# Patient Record
Sex: MALE | Race: WHITE | NOT HISPANIC OR LATINO | Employment: OTHER | URBAN - METROPOLITAN AREA
[De-identification: names, ages, dates, MRNs, and addresses within clinical notes are randomized per-mention and may not be internally consistent; named-entity substitution may affect disease eponyms.]

---

## 2023-01-27 ENCOUNTER — PRE-ADMISSION TESTING (OUTPATIENT)
Dept: PREADMISSION TESTING | Facility: HOSPITAL | Age: 78
End: 2023-01-27
Payer: MEDICARE

## 2023-01-27 ENCOUNTER — HOSPITAL ENCOUNTER (OUTPATIENT)
Dept: GENERAL RADIOLOGY | Facility: HOSPITAL | Age: 78
Discharge: HOME | End: 2023-01-27
Payer: MEDICARE

## 2023-01-27 VITALS
OXYGEN SATURATION: 96 % | HEIGHT: 72 IN | SYSTOLIC BLOOD PRESSURE: 171 MMHG | BODY MASS INDEX: 33.36 KG/M2 | WEIGHT: 246.3 LBS | TEMPERATURE: 97.1 F | RESPIRATION RATE: 16 BRPM | DIASTOLIC BLOOD PRESSURE: 94 MMHG | HEART RATE: 90 BPM

## 2023-01-27 LAB
ALBUMIN SERPL-MCNC: 4 G/DL (ref 3.5–5.2)
ALBUMIN/GLOB SERPL: 1.6 G/DL
ALP SERPL-CCNC: 63 U/L (ref 39–117)
ALT SERPL W P-5'-P-CCNC: 28 U/L (ref 1–41)
ANION GAP SERPL CALCULATED.3IONS-SCNC: 10.3 MMOL/L (ref 5–15)
AST SERPL-CCNC: 17 U/L (ref 1–40)
BILIRUB SERPL-MCNC: 0.4 MG/DL (ref 0–1.2)
BUN SERPL-MCNC: 14 MG/DL (ref 8–23)
BUN/CREAT SERPL: 14.6 (ref 7–25)
CALCIUM SPEC-SCNC: 8.9 MG/DL (ref 8.6–10.5)
CHLORIDE SERPL-SCNC: 101 MMOL/L (ref 98–107)
CO2 SERPL-SCNC: 24.7 MMOL/L (ref 22–29)
CREAT SERPL-MCNC: 0.96 MG/DL (ref 0.76–1.27)
DEPRECATED RDW RBC AUTO: 43.3 FL (ref 37–54)
EGFRCR SERPLBLD CKD-EPI 2021: 81.9 ML/MIN/1.73
ERYTHROCYTE [DISTWIDTH] IN BLOOD BY AUTOMATED COUNT: 13 % (ref 12.3–15.4)
GLOBULIN UR ELPH-MCNC: 2.5 GM/DL
GLUCOSE SERPL-MCNC: 196 MG/DL (ref 65–99)
HBA1C MFR BLD: 8 % (ref 4.8–5.6)
HCT VFR BLD AUTO: 43.7 % (ref 37.5–51)
HGB BLD-MCNC: 15.1 G/DL (ref 13–17.7)
INR PPP: 1.06 (ref 0.9–1.1)
MCH RBC QN AUTO: 31.4 PG (ref 26.6–33)
MCHC RBC AUTO-ENTMCNC: 34.6 G/DL (ref 31.5–35.7)
MCV RBC AUTO: 90.9 FL (ref 79–97)
PLATELET # BLD AUTO: 253 10*3/MM3 (ref 140–450)
PMV BLD AUTO: 8.7 FL (ref 6–12)
POTASSIUM SERPL-SCNC: 4.3 MMOL/L (ref 3.5–5.2)
PROT SERPL-MCNC: 6.5 G/DL (ref 6–8.5)
PROTHROMBIN TIME: 13.9 SECONDS (ref 11.7–14.2)
RBC # BLD AUTO: 4.81 10*6/MM3 (ref 4.14–5.8)
SODIUM SERPL-SCNC: 136 MMOL/L (ref 136–145)
WBC NRBC COR # BLD: 6.04 10*3/MM3 (ref 3.4–10.8)

## 2023-01-27 PROCEDURE — 36415 COLL VENOUS BLD VENIPUNCTURE: CPT

## 2023-01-27 PROCEDURE — 83036 HEMOGLOBIN GLYCOSYLATED A1C: CPT

## 2023-01-27 PROCEDURE — 80053 COMPREHEN METABOLIC PANEL: CPT

## 2023-01-27 PROCEDURE — 85027 COMPLETE CBC AUTOMATED: CPT

## 2023-01-27 PROCEDURE — 73560 X-RAY EXAM OF KNEE 1 OR 2: CPT

## 2023-01-27 PROCEDURE — 85610 PROTHROMBIN TIME: CPT

## 2023-01-27 PROCEDURE — 71046 X-RAY EXAM CHEST 2 VIEWS: CPT

## 2023-01-27 RX ORDER — AMLODIPINE AND OLMESARTAN MEDOXOMIL 5; 40 MG/1; MG/1
1 TABLET ORAL DAILY
COMMUNITY
Start: 2023-01-19 | End: 2023-01-27

## 2023-01-27 RX ORDER — TAMSULOSIN HYDROCHLORIDE 0.4 MG/1
2 CAPSULE ORAL NIGHTLY
COMMUNITY
Start: 2022-12-30

## 2023-01-27 RX ORDER — DUTASTERIDE 0.5 MG/1
1 CAPSULE, LIQUID FILLED ORAL NIGHTLY
COMMUNITY
Start: 2023-01-06

## 2023-01-27 RX ORDER — FENOFIBRATE 160 MG/1
1 TABLET ORAL DAILY
COMMUNITY
Start: 2023-01-03

## 2023-01-27 RX ORDER — POTASSIUM CITRATE 10 MEQ/1
2 TABLET, EXTENDED RELEASE ORAL 3 TIMES DAILY
COMMUNITY
Start: 2023-01-04

## 2023-01-27 RX ORDER — BUDESONIDE AND FORMOTEROL FUMARATE DIHYDRATE 160; 4.5 UG/1; UG/1
2 AEROSOL RESPIRATORY (INHALATION) 2 TIMES DAILY
COMMUNITY
Start: 2023-01-04

## 2023-01-27 RX ORDER — EZETIMIBE 10 MG/1
10 TABLET ORAL DAILY
COMMUNITY
Start: 2023-01-03

## 2023-01-27 RX ORDER — SITAGLIPTIN 100 MG/1
1 TABLET, FILM COATED ORAL DAILY
COMMUNITY
Start: 2023-01-13

## 2023-01-27 RX ORDER — ASPIRIN 81 MG/1
81 TABLET, CHEWABLE ORAL DAILY
COMMUNITY
End: 2023-02-10 | Stop reason: HOSPADM

## 2023-01-27 RX ORDER — CHLORHEXIDINE GLUCONATE 500 MG/1
1 CLOTH TOPICAL TAKE AS DIRECTED
COMMUNITY
End: 2023-02-10 | Stop reason: HOSPADM

## 2023-01-27 RX ORDER — SIMVASTATIN 20 MG
20 TABLET ORAL DAILY
COMMUNITY
Start: 2023-01-03

## 2023-01-27 RX ORDER — CELECOXIB 200 MG/1
1 CAPSULE ORAL DAILY
COMMUNITY
Start: 2023-01-04

## 2023-01-27 NOTE — DISCHARGE INSTRUCTIONS
Take the following medications the morning of surgery: TAKE AND BRING INHALERS    TIME OF ARRIVAL: WILL BE CALLED DAY BEFORE SURGERY    If you are on prescription narcotic pain medication to control your pain you may also take that medication the morning of surgery.    General Instructions:  Do not eat solid food after midnight the night before surgery.  You may drink clear liquids day of surgery but must stop at least one hour before your hospital arrival time.  It is beneficial for you to have a clear drink that contains carbohydrates the day of surgery.  We suggest a 12 to 20 ounce bottle of Gatorade or Powerade for non-diabetic patients or a 12 to 20 ounce bottle of G2 or Powerade Zero for diabetic patients. (Pediatric patients, are not advised to drink a 12 to 20 ounce carbohydrate drink)    Clear liquids are liquids you can see through.  Nothing red in color.     Plain water                               Sports drinks  Sodas                                   Gelatin (Jell-O)  Fruit juices without pulp such as white grape juice and apple juice  Popsicles that contain no fruit or yogurt  Tea or coffee (no cream or milk added)  Gatorade / Powerade  G2 / Powerade Zero    Patients who avoid smoking, chewing tobacco and alcohol for 4 weeks prior to surgery have a reduced risk of post-operative complications.  Quit smoking as many days before surgery as you can.  Do not smoke, use chewing tobacco or drink alcohol the day of surgery.   If applicable bring your C-PAP/ BI-PAP machine.  Bring any papers given to you in the doctor’s office.  Wear clean comfortable clothes.  Do not wear contact lenses, false eyelashes or make-up.  Bring a case for your glasses.   Bring crutches or walker if applicable.  Remove all piercings.  Leave jewelry and any other valuables at home.  Hair extensions with metal clips must be removed prior to surgery.  The Pre-Admission Testing nurse will instruct you to bring medications if unable to  obtain an accurate list in Pre-Admission Testing.        Preventing a Surgical Site Infection:  For 2 to 3 days before surgery, avoid shaving with a razor because the razor can irritate skin and make it easier to develop an infection.    Any areas of open skin can increase the risk of a post-operative wound infection by allowing bacteria to enter and travel throughout the body.  Notify your surgeon if you have any skin wounds / rashes even if it is not near the expected surgical site.  The area will need assessed to determine if surgery should be delayed until it is healed.  The night prior to surgery shower using a fresh bar of anti-bacterial soap (such as Dial) and clean washcloth.  Sleep in a clean bed with clean clothing.  Do not allow pets to sleep with you.  Shower on the morning of surgery using a fresh bar of anti-bacterial soap (such as Dial) and clean washcloth.  Dry with a clean towel and dress in clean clothing.  Ask your surgeon if you will be receiving antibiotics prior to surgery.  Make sure you, your family, and all healthcare providers clean their hands with soap and water or an alcohol based hand  before caring for you or your wound.    Day of surgery:  Your arrival time is approximately two hours before your scheduled surgery time.  Upon arrival, a Pre-op nurse and Anesthesiologist will review your health history, obtain vital signs, and answer questions you may have.  The only belongings needed at this time will be a list of your home medications and if applicable your C-PAP/BI-PAP machine.  A Pre-op nurse will start an IV and you may receive medication in preparation for surgery, including something to help you relax.     Please be aware that surgery does come with discomfort.  We want to make every effort to control your discomfort so please discuss any uncontrolled symptoms with your nurse.   Your doctor will most likely have prescribed pain medications.      If you are going home  after surgery you will receive individualized written care instructions before being discharged.  A responsible adult must drive you to and from the hospital on the day of your surgery and stay with you for 24 hours.  Discharge prescriptions can be filled by the hospital pharmacy during regular pharmacy hours.  If you are having surgery late in the day/evening your prescription may be e-prescribed to your pharmacy.  Please verify your pharmacy hours or chose a 24 hour pharmacy to avoid not having access to your prescription because your pharmacy has closed for the day.    If you are staying overnight following surgery, you will be transported to your hospital room following the recovery period.  [facility] has all private rooms.    If you have any questions please call Pre-Admission Testing at [phone].  Deductibles and co-payments are collected on the day of service. Please be prepared to pay the required co-pay, deductible or deposit on the day of service as defined by your plan.    Call your surgeon immediately if you experience any of the following symptoms:  Sore Throat  Shortness of Breath or difficulty breathing  Cough  Chills  Body soreness or muscle pain  Headache  Fever  New loss of taste or smell  Do not arrive for your surgery ill.  Your procedure will need to be rescheduled to another time.  You will need to call your physician before the day of surgery to avoid any unnecessary exposure to hospital staff as well as other patients.    CHLORHEXIDINE CLOTH INSTRUCTIONS  The morning of surgery follow these instructions using the Chlorhexidine cloths you've been given.  These steps reduce bacteria on the body.  Do not use the cloths near your eyes, ears mouth, genitalia or on open wounds.  Throw the cloths away after use but do not try to flush them down a toilet.      Open and remove one cloth at a time from the package.    Leave the cloth unfolded and begin the bathing.  Massage the  skin with the cloths using gentle pressure to remove bacteria.  Do not scrub harshly.   Follow the steps below with one 2% CHG cloth per area (6 total cloths).  One cloth for neck, shoulders and chest.  One cloth for both arms, hands, fingers and underarms (do underarms last).  One cloth for the abdomen followed by groin.  One cloth for right leg and foot including between the toes.  One cloth for left leg and foot including between the toes.  The last cloth is to be used for the back of the neck, back and buttocks.    Allow the CHG to air dry 3 minutes on the skin which will give it time to work and decrease the chance of irritation.  The skin may feel sticky until it is dry.  Do not rinse with water or any other liquid or you will lose the beneficial effects of the CHG.  If mild skin irritation occurs, do rinse the skin to remove the CHG.  Report this to the nurse at time of admission.  Do not apply lotions, creams, ointments, deodorants or perfumes after using the clothes. Dress in clean clothes before coming to the hospital.

## 2023-02-09 ENCOUNTER — APPOINTMENT (OUTPATIENT)
Dept: GENERAL RADIOLOGY | Facility: HOSPITAL | Age: 78
End: 2023-02-09
Payer: MEDICARE

## 2023-02-09 ENCOUNTER — ANESTHESIA (OUTPATIENT)
Dept: PERIOP | Facility: HOSPITAL | Age: 78
End: 2023-02-09
Payer: MEDICARE

## 2023-02-09 ENCOUNTER — HOSPITAL ENCOUNTER (OUTPATIENT)
Facility: HOSPITAL | Age: 78
Discharge: HOME | End: 2023-02-10
Attending: ORTHOPAEDIC SURGERY | Admitting: ORTHOPAEDIC SURGERY
Payer: MEDICARE

## 2023-02-09 ENCOUNTER — ANESTHESIA EVENT (OUTPATIENT)
Dept: PERIOP | Facility: HOSPITAL | Age: 78
End: 2023-02-09
Payer: MEDICARE

## 2023-02-09 DIAGNOSIS — M17.12 PRIMARY OSTEOARTHRITIS OF LEFT KNEE: Primary | ICD-10-CM

## 2023-02-09 LAB — GLUCOSE BLDC GLUCOMTR-MCNC: 148 MG/DL (ref 70–130)

## 2023-02-09 PROCEDURE — 73560 X-RAY EXAM OF KNEE 1 OR 2: CPT

## 2023-02-09 PROCEDURE — 63710000001 TAMSULOSIN 0.4 MG CAPSULE: Performed by: ORTHOPAEDIC SURGERY

## 2023-02-09 PROCEDURE — 25010000002 FENTANYL CITRATE (PF) 50 MCG/ML SOLUTION: Performed by: ANESTHESIOLOGY

## 2023-02-09 PROCEDURE — 63710000001 HYDROCODONE-ACETAMINOPHEN 7.5-325 MG TABLET: Performed by: NURSE ANESTHETIST, CERTIFIED REGISTERED

## 2023-02-09 PROCEDURE — A9270 NON-COVERED ITEM OR SERVICE: HCPCS | Performed by: ORTHOPAEDIC SURGERY

## 2023-02-09 PROCEDURE — 25010000002 ROPIVACAINE PER 1 MG: Performed by: ORTHOPAEDIC SURGERY

## 2023-02-09 PROCEDURE — 94640 AIRWAY INHALATION TREATMENT: CPT

## 2023-02-09 PROCEDURE — 63710000001 FINASTERIDE 5 MG TABLET: Performed by: ORTHOPAEDIC SURGERY

## 2023-02-09 PROCEDURE — 25010000002 CEFAZOLIN IN DEXTROSE 2-4 GM/100ML-% SOLUTION: Performed by: ORTHOPAEDIC SURGERY

## 2023-02-09 PROCEDURE — 63710000001 BUDESONIDE-FORMOTEROL 160-4.5 MCG/ACT AEROSOL 6 G INHALER: Performed by: ORTHOPAEDIC SURGERY

## 2023-02-09 PROCEDURE — G0378 HOSPITAL OBSERVATION PER HR: HCPCS

## 2023-02-09 PROCEDURE — 25010000002 MIDAZOLAM PER 1 MG: Performed by: ANESTHESIOLOGY

## 2023-02-09 PROCEDURE — 25010000002 DEXAMETHASONE SODIUM PHOSPHATE 20 MG/5ML SOLUTION: Performed by: NURSE ANESTHETIST, CERTIFIED REGISTERED

## 2023-02-09 PROCEDURE — 63710000001 CELECOXIB 200 MG CAPSULE: Performed by: ORTHOPAEDIC SURGERY

## 2023-02-09 PROCEDURE — 25010000002 ONDANSETRON PER 1 MG: Performed by: NURSE ANESTHETIST, CERTIFIED REGISTERED

## 2023-02-09 PROCEDURE — C1713 ANCHOR/SCREW BN/BN,TIS/BN: HCPCS | Performed by: ORTHOPAEDIC SURGERY

## 2023-02-09 PROCEDURE — 97161 PT EVAL LOW COMPLEX 20 MIN: CPT

## 2023-02-09 PROCEDURE — 97110 THERAPEUTIC EXERCISES: CPT

## 2023-02-09 PROCEDURE — 94799 UNLISTED PULMONARY SVC/PX: CPT

## 2023-02-09 PROCEDURE — 25010000002 FENTANYL CITRATE (PF) 100 MCG/2ML SOLUTION: Performed by: NURSE ANESTHETIST, CERTIFIED REGISTERED

## 2023-02-09 PROCEDURE — 63710000001 POVIDONE-IODINE 10 % SOLUTION 30 ML BOTTLE: Performed by: ORTHOPAEDIC SURGERY

## 2023-02-09 PROCEDURE — 63710000001 ACETAMINOPHEN 500 MG TABLET: Performed by: ORTHOPAEDIC SURGERY

## 2023-02-09 PROCEDURE — 25010000002 MORPHINE PER 10 MG: Performed by: ORTHOPAEDIC SURGERY

## 2023-02-09 PROCEDURE — 25010000002 KETOROLAC TROMETHAMINE PER 15 MG: Performed by: ORTHOPAEDIC SURGERY

## 2023-02-09 PROCEDURE — 25010000002 ROPIVACAINE PER 1 MG: Performed by: ANESTHESIOLOGY

## 2023-02-09 PROCEDURE — C1776 JOINT DEVICE (IMPLANTABLE): HCPCS | Performed by: ORTHOPAEDIC SURGERY

## 2023-02-09 PROCEDURE — 94760 N-INVAS EAR/PLS OXIMETRY 1: CPT

## 2023-02-09 PROCEDURE — 25010000002 CEFAZOLIN PER 500 MG: Performed by: NURSE ANESTHETIST, CERTIFIED REGISTERED

## 2023-02-09 PROCEDURE — 25010000002 EPINEPHRINE 1 MG/ML SOLUTION 30 ML VIAL: Performed by: ORTHOPAEDIC SURGERY

## 2023-02-09 PROCEDURE — 63710000001 OXYCODONE-ACETAMINOPHEN 5-325 MG TABLET: Performed by: ORTHOPAEDIC SURGERY

## 2023-02-09 PROCEDURE — 82962 GLUCOSE BLOOD TEST: CPT

## 2023-02-09 PROCEDURE — 25010000002 PROPOFOL 10 MG/ML EMULSION: Performed by: NURSE ANESTHETIST, CERTIFIED REGISTERED

## 2023-02-09 PROCEDURE — 63710000001 MUPIROCIN 2 % OINTMENT: Performed by: ORTHOPAEDIC SURGERY

## 2023-02-09 PROCEDURE — A9270 NON-COVERED ITEM OR SERVICE: HCPCS | Performed by: NURSE ANESTHETIST, CERTIFIED REGISTERED

## 2023-02-09 DEVICE — IMPLANTABLE DEVICE
Type: IMPLANTABLE DEVICE | Site: KNEE | Status: FUNCTIONAL
Brand: VANGUARD® KNEE SYSTEM

## 2023-02-09 DEVICE — CAP TOTL KN CMT PRIMARY: Type: IMPLANTABLE DEVICE | Site: KNEE | Status: FUNCTIONAL

## 2023-02-09 DEVICE — PAT 3PEG THN 37X9.6 37MM: Type: IMPLANTABLE DEVICE | Site: KNEE | Status: FUNCTIONAL

## 2023-02-09 DEVICE — IMPLANTABLE DEVICE
Type: IMPLANTABLE DEVICE | Site: KNEE | Status: FUNCTIONAL
Brand: BIOMET KNEE SYSTEM

## 2023-02-09 DEVICE — CMT BONE R 1X40: Type: IMPLANTABLE DEVICE | Site: KNEE | Status: FUNCTIONAL

## 2023-02-09 DEVICE — IMPLANTABLE DEVICE
Type: IMPLANTABLE DEVICE | Site: KNEE | Status: FUNCTIONAL
Brand: BIOMET® KNEE SYSTEM

## 2023-02-09 RX ORDER — DOCUSATE SODIUM 250 MG
250 CAPSULE ORAL 2 TIMES DAILY PRN
Qty: 30 CAPSULE | Refills: 1 | Status: SHIPPED | OUTPATIENT
Start: 2023-02-09

## 2023-02-09 RX ORDER — DIAZEPAM 5 MG/1
5 TABLET ORAL 2 TIMES DAILY PRN
Status: DISCONTINUED | OUTPATIENT
Start: 2023-02-09 | End: 2023-02-10 | Stop reason: HOSPADM

## 2023-02-09 RX ORDER — SODIUM CHLORIDE 9 MG/ML
40 INJECTION, SOLUTION INTRAVENOUS AS NEEDED
Status: DISCONTINUED | OUTPATIENT
Start: 2023-02-09 | End: 2023-02-10 | Stop reason: HOSPADM

## 2023-02-09 RX ORDER — FLUMAZENIL 0.1 MG/ML
0.2 INJECTION INTRAVENOUS AS NEEDED
Status: DISCONTINUED | OUTPATIENT
Start: 2023-02-09 | End: 2023-02-09 | Stop reason: HOSPADM

## 2023-02-09 RX ORDER — FENTANYL CITRATE 50 UG/ML
50 INJECTION, SOLUTION INTRAMUSCULAR; INTRAVENOUS
Status: DISCONTINUED | OUTPATIENT
Start: 2023-02-09 | End: 2023-02-09 | Stop reason: HOSPADM

## 2023-02-09 RX ORDER — ACETAMINOPHEN 500 MG
1000 TABLET ORAL ONCE
Status: COMPLETED | OUTPATIENT
Start: 2023-02-09 | End: 2023-02-09

## 2023-02-09 RX ORDER — DIPHENHYDRAMINE HCL 25 MG
25 CAPSULE ORAL EVERY 6 HOURS PRN
Status: DISCONTINUED | OUTPATIENT
Start: 2023-02-09 | End: 2023-02-10 | Stop reason: HOSPADM

## 2023-02-09 RX ORDER — HYDRALAZINE HYDROCHLORIDE 20 MG/ML
5 INJECTION INTRAMUSCULAR; INTRAVENOUS
Status: DISCONTINUED | OUTPATIENT
Start: 2023-02-09 | End: 2023-02-09 | Stop reason: HOSPADM

## 2023-02-09 RX ORDER — FENTANYL CITRATE 50 UG/ML
INJECTION, SOLUTION INTRAMUSCULAR; INTRAVENOUS AS NEEDED
Status: DISCONTINUED | OUTPATIENT
Start: 2023-02-09 | End: 2023-02-09 | Stop reason: SURG

## 2023-02-09 RX ORDER — OXYCODONE HYDROCHLORIDE AND ACETAMINOPHEN 5; 325 MG/1; MG/1
2 TABLET ORAL EVERY 4 HOURS PRN
Status: DISCONTINUED | OUTPATIENT
Start: 2023-02-09 | End: 2023-02-10 | Stop reason: HOSPADM

## 2023-02-09 RX ORDER — FENOFIBRATE 145 MG/1
145 TABLET, COATED ORAL DAILY
Status: DISCONTINUED | OUTPATIENT
Start: 2023-02-10 | End: 2023-02-10 | Stop reason: HOSPADM

## 2023-02-09 RX ORDER — ACETAMINOPHEN 325 MG/1
325 TABLET ORAL EVERY 4 HOURS PRN
Status: DISCONTINUED | OUTPATIENT
Start: 2023-02-09 | End: 2023-02-10 | Stop reason: HOSPADM

## 2023-02-09 RX ORDER — CEFAZOLIN SODIUM 500 MG/2.2ML
INJECTION, POWDER, FOR SOLUTION INTRAMUSCULAR; INTRAVENOUS AS NEEDED
Status: DISCONTINUED | OUTPATIENT
Start: 2023-02-09 | End: 2023-02-09 | Stop reason: SURG

## 2023-02-09 RX ORDER — ONDANSETRON 2 MG/ML
INJECTION INTRAMUSCULAR; INTRAVENOUS AS NEEDED
Status: DISCONTINUED | OUTPATIENT
Start: 2023-02-09 | End: 2023-02-09 | Stop reason: SURG

## 2023-02-09 RX ORDER — FINASTERIDE 5 MG/1
5 TABLET, FILM COATED ORAL NIGHTLY
Status: DISCONTINUED | OUTPATIENT
Start: 2023-02-09 | End: 2023-02-10 | Stop reason: HOSPADM

## 2023-02-09 RX ORDER — SODIUM CHLORIDE 0.9 % (FLUSH) 0.9 %
10 SYRINGE (ML) INJECTION EVERY 12 HOURS SCHEDULED
Status: DISCONTINUED | OUTPATIENT
Start: 2023-02-09 | End: 2023-02-10 | Stop reason: HOSPADM

## 2023-02-09 RX ORDER — CLINDAMYCIN PHOSPHATE 900 MG/50ML
900 INJECTION INTRAVENOUS EVERY 8 HOURS
Status: COMPLETED | OUTPATIENT
Start: 2023-02-09 | End: 2023-02-10

## 2023-02-09 RX ORDER — MIDAZOLAM HYDROCHLORIDE 1 MG/ML
0.5 INJECTION INTRAMUSCULAR; INTRAVENOUS
Status: COMPLETED | OUTPATIENT
Start: 2023-02-09 | End: 2023-02-09

## 2023-02-09 RX ORDER — PROMETHAZINE HYDROCHLORIDE 12.5 MG/1
12.5 TABLET ORAL EVERY 6 HOURS PRN
Status: DISCONTINUED | OUTPATIENT
Start: 2023-02-09 | End: 2023-02-10 | Stop reason: HOSPADM

## 2023-02-09 RX ORDER — CEFAZOLIN SODIUM 2 G/100ML
2 INJECTION, SOLUTION INTRAVENOUS ONCE
Status: COMPLETED | OUTPATIENT
Start: 2023-02-09 | End: 2023-02-09

## 2023-02-09 RX ORDER — MORPHINE SULFATE 2 MG/ML
4 INJECTION, SOLUTION INTRAMUSCULAR; INTRAVENOUS
Status: DISCONTINUED | OUTPATIENT
Start: 2023-02-09 | End: 2023-02-10 | Stop reason: HOSPADM

## 2023-02-09 RX ORDER — BUDESONIDE AND FORMOTEROL FUMARATE DIHYDRATE 160; 4.5 UG/1; UG/1
2 AEROSOL RESPIRATORY (INHALATION)
Status: DISCONTINUED | OUTPATIENT
Start: 2023-02-09 | End: 2023-02-10 | Stop reason: HOSPADM

## 2023-02-09 RX ORDER — SODIUM CHLORIDE 0.9 % (FLUSH) 0.9 %
3 SYRINGE (ML) INJECTION EVERY 12 HOURS SCHEDULED
Status: DISCONTINUED | OUTPATIENT
Start: 2023-02-09 | End: 2023-02-09 | Stop reason: HOSPADM

## 2023-02-09 RX ORDER — POTASSIUM CHLORIDE 750 MG/1
10 TABLET, FILM COATED, EXTENDED RELEASE ORAL
Status: DISCONTINUED | OUTPATIENT
Start: 2023-02-09 | End: 2023-02-10 | Stop reason: HOSPADM

## 2023-02-09 RX ORDER — DOCUSATE SODIUM 100 MG/1
100 CAPSULE, LIQUID FILLED ORAL 2 TIMES DAILY PRN
Status: DISCONTINUED | OUTPATIENT
Start: 2023-02-09 | End: 2023-02-10 | Stop reason: HOSPADM

## 2023-02-09 RX ORDER — EPHEDRINE SULFATE 50 MG/ML
5 INJECTION, SOLUTION INTRAVENOUS ONCE AS NEEDED
Status: DISCONTINUED | OUTPATIENT
Start: 2023-02-09 | End: 2023-02-09 | Stop reason: HOSPADM

## 2023-02-09 RX ORDER — DIPHENHYDRAMINE HYDROCHLORIDE 50 MG/ML
12.5 INJECTION INTRAMUSCULAR; INTRAVENOUS EVERY 6 HOURS PRN
Status: DISCONTINUED | OUTPATIENT
Start: 2023-02-09 | End: 2023-02-10 | Stop reason: HOSPADM

## 2023-02-09 RX ORDER — ATORVASTATIN CALCIUM 20 MG/1
10 TABLET, FILM COATED ORAL DAILY
Status: DISCONTINUED | OUTPATIENT
Start: 2023-02-10 | End: 2023-02-10 | Stop reason: HOSPADM

## 2023-02-09 RX ORDER — PROMETHAZINE HYDROCHLORIDE 25 MG/1
25 TABLET ORAL ONCE AS NEEDED
Status: DISCONTINUED | OUTPATIENT
Start: 2023-02-09 | End: 2023-02-09 | Stop reason: HOSPADM

## 2023-02-09 RX ORDER — OXYCODONE AND ACETAMINOPHEN 7.5; 325 MG/1; MG/1
1 TABLET ORAL EVERY 4 HOURS PRN
Status: DISCONTINUED | OUTPATIENT
Start: 2023-02-09 | End: 2023-02-09 | Stop reason: HOSPADM

## 2023-02-09 RX ORDER — SODIUM CHLORIDE 0.9 % (FLUSH) 0.9 %
1-10 SYRINGE (ML) INJECTION AS NEEDED
Status: DISCONTINUED | OUTPATIENT
Start: 2023-02-09 | End: 2023-02-10 | Stop reason: HOSPADM

## 2023-02-09 RX ORDER — DIPHENHYDRAMINE HYDROCHLORIDE 50 MG/ML
12.5 INJECTION INTRAMUSCULAR; INTRAVENOUS
Status: DISCONTINUED | OUTPATIENT
Start: 2023-02-09 | End: 2023-02-09 | Stop reason: HOSPADM

## 2023-02-09 RX ORDER — CHOLECALCIFEROL (VITAMIN D3) 125 MCG
5 CAPSULE ORAL NIGHTLY PRN
Status: DISCONTINUED | OUTPATIENT
Start: 2023-02-09 | End: 2023-02-10 | Stop reason: HOSPADM

## 2023-02-09 RX ORDER — ROPIVACAINE HYDROCHLORIDE 5 MG/ML
INJECTION, SOLUTION EPIDURAL; INFILTRATION; PERINEURAL
Status: COMPLETED | OUTPATIENT
Start: 2023-02-09 | End: 2023-02-09

## 2023-02-09 RX ORDER — CELECOXIB 200 MG/1
200 CAPSULE ORAL ONCE
Status: COMPLETED | OUTPATIENT
Start: 2023-02-09 | End: 2023-02-09

## 2023-02-09 RX ORDER — SODIUM CHLORIDE, SODIUM LACTATE, POTASSIUM CHLORIDE, CALCIUM CHLORIDE 600; 310; 30; 20 MG/100ML; MG/100ML; MG/100ML; MG/100ML
9 INJECTION, SOLUTION INTRAVENOUS CONTINUOUS
Status: DISCONTINUED | OUTPATIENT
Start: 2023-02-09 | End: 2023-02-10 | Stop reason: HOSPADM

## 2023-02-09 RX ORDER — FERROUS SULFATE 325(65) MG
325 TABLET ORAL
Status: DISCONTINUED | OUTPATIENT
Start: 2023-02-10 | End: 2023-02-10 | Stop reason: HOSPADM

## 2023-02-09 RX ORDER — LABETALOL HYDROCHLORIDE 5 MG/ML
5 INJECTION, SOLUTION INTRAVENOUS
Status: DISCONTINUED | OUTPATIENT
Start: 2023-02-09 | End: 2023-02-09 | Stop reason: HOSPADM

## 2023-02-09 RX ORDER — OXYCODONE HYDROCHLORIDE AND ACETAMINOPHEN 5; 325 MG/1; MG/1
1 TABLET ORAL EVERY 4 HOURS PRN
Status: DISCONTINUED | OUTPATIENT
Start: 2023-02-09 | End: 2023-02-10 | Stop reason: HOSPADM

## 2023-02-09 RX ORDER — LIDOCAINE HYDROCHLORIDE 20 MG/ML
INJECTION, SOLUTION INFILTRATION; PERINEURAL AS NEEDED
Status: DISCONTINUED | OUTPATIENT
Start: 2023-02-09 | End: 2023-02-09 | Stop reason: SURG

## 2023-02-09 RX ORDER — TAMSULOSIN HYDROCHLORIDE 0.4 MG/1
0.8 CAPSULE ORAL NIGHTLY
Status: DISCONTINUED | OUTPATIENT
Start: 2023-02-09 | End: 2023-02-10 | Stop reason: HOSPADM

## 2023-02-09 RX ORDER — MAGNESIUM HYDROXIDE 1200 MG/15ML
LIQUID ORAL AS NEEDED
Status: DISCONTINUED | OUTPATIENT
Start: 2023-02-09 | End: 2023-02-09 | Stop reason: HOSPADM

## 2023-02-09 RX ORDER — DEXAMETHASONE SODIUM PHOSPHATE 4 MG/ML
INJECTION, SOLUTION INTRA-ARTICULAR; INTRALESIONAL; INTRAMUSCULAR; INTRAVENOUS; SOFT TISSUE AS NEEDED
Status: DISCONTINUED | OUTPATIENT
Start: 2023-02-09 | End: 2023-02-09 | Stop reason: SURG

## 2023-02-09 RX ORDER — LIDOCAINE HYDROCHLORIDE 10 MG/ML
0.5 INJECTION, SOLUTION EPIDURAL; INFILTRATION; INTRACAUDAL; PERINEURAL ONCE AS NEEDED
Status: DISCONTINUED | OUTPATIENT
Start: 2023-02-09 | End: 2023-02-09 | Stop reason: HOSPADM

## 2023-02-09 RX ORDER — SODIUM CHLORIDE 0.9 % (FLUSH) 0.9 %
3-10 SYRINGE (ML) INJECTION AS NEEDED
Status: DISCONTINUED | OUTPATIENT
Start: 2023-02-09 | End: 2023-02-09 | Stop reason: HOSPADM

## 2023-02-09 RX ORDER — HYDROMORPHONE HYDROCHLORIDE 1 MG/ML
0.5 INJECTION, SOLUTION INTRAMUSCULAR; INTRAVENOUS; SUBCUTANEOUS
Status: DISCONTINUED | OUTPATIENT
Start: 2023-02-09 | End: 2023-02-09 | Stop reason: HOSPADM

## 2023-02-09 RX ORDER — NALOXONE HCL 0.4 MG/ML
0.4 VIAL (ML) INJECTION
Status: DISCONTINUED | OUTPATIENT
Start: 2023-02-09 | End: 2023-02-10 | Stop reason: HOSPADM

## 2023-02-09 RX ORDER — PROMETHAZINE HYDROCHLORIDE 25 MG/1
25 SUPPOSITORY RECTAL ONCE AS NEEDED
Status: DISCONTINUED | OUTPATIENT
Start: 2023-02-09 | End: 2023-02-09 | Stop reason: HOSPADM

## 2023-02-09 RX ORDER — DIPHENHYDRAMINE HCL 25 MG
25 CAPSULE ORAL
Status: DISCONTINUED | OUTPATIENT
Start: 2023-02-09 | End: 2023-02-09 | Stop reason: HOSPADM

## 2023-02-09 RX ORDER — HYDROCODONE BITARTRATE AND ACETAMINOPHEN 7.5; 325 MG/1; MG/1
1 TABLET ORAL ONCE AS NEEDED
Status: COMPLETED | OUTPATIENT
Start: 2023-02-09 | End: 2023-02-09

## 2023-02-09 RX ORDER — CELECOXIB 200 MG/1
200 CAPSULE ORAL DAILY
Status: DISCONTINUED | OUTPATIENT
Start: 2023-02-10 | End: 2023-02-10 | Stop reason: HOSPADM

## 2023-02-09 RX ORDER — ROCURONIUM BROMIDE 10 MG/ML
INJECTION, SOLUTION INTRAVENOUS AS NEEDED
Status: DISCONTINUED | OUTPATIENT
Start: 2023-02-09 | End: 2023-02-09 | Stop reason: SURG

## 2023-02-09 RX ORDER — FAMOTIDINE 10 MG/ML
20 INJECTION, SOLUTION INTRAVENOUS ONCE
Status: COMPLETED | OUTPATIENT
Start: 2023-02-09 | End: 2023-02-09

## 2023-02-09 RX ORDER — TRANEXAMIC ACID 100 MG/ML
INJECTION, SOLUTION INTRAVENOUS AS NEEDED
Status: DISCONTINUED | OUTPATIENT
Start: 2023-02-09 | End: 2023-02-09 | Stop reason: SURG

## 2023-02-09 RX ORDER — KETOROLAC TROMETHAMINE 15 MG/ML
15 INJECTION, SOLUTION INTRAMUSCULAR; INTRAVENOUS EVERY 6 HOURS PRN
Status: DISCONTINUED | OUTPATIENT
Start: 2023-02-09 | End: 2023-02-10 | Stop reason: HOSPADM

## 2023-02-09 RX ORDER — ONDANSETRON 2 MG/ML
4 INJECTION INTRAMUSCULAR; INTRAVENOUS EVERY 6 HOURS PRN
Status: DISCONTINUED | OUTPATIENT
Start: 2023-02-09 | End: 2023-02-10 | Stop reason: HOSPADM

## 2023-02-09 RX ORDER — ASPIRIN 325 MG
325 TABLET, DELAYED RELEASE (ENTERIC COATED) ORAL 2 TIMES DAILY WITH MEALS
Qty: 60 TABLET | Refills: 0 | Status: SHIPPED | OUTPATIENT
Start: 2023-02-10

## 2023-02-09 RX ORDER — ONDANSETRON 4 MG/1
4 TABLET, FILM COATED ORAL EVERY 6 HOURS PRN
Status: DISCONTINUED | OUTPATIENT
Start: 2023-02-09 | End: 2023-02-10 | Stop reason: HOSPADM

## 2023-02-09 RX ORDER — ASPIRIN 325 MG
325 TABLET, DELAYED RELEASE (ENTERIC COATED) ORAL 2 TIMES DAILY WITH MEALS
Status: DISCONTINUED | OUTPATIENT
Start: 2023-02-10 | End: 2023-02-10 | Stop reason: HOSPADM

## 2023-02-09 RX ORDER — NALOXONE HCL 0.4 MG/ML
0.2 VIAL (ML) INJECTION AS NEEDED
Status: DISCONTINUED | OUTPATIENT
Start: 2023-02-09 | End: 2023-02-09 | Stop reason: HOSPADM

## 2023-02-09 RX ORDER — ONDANSETRON 2 MG/ML
4 INJECTION INTRAMUSCULAR; INTRAVENOUS ONCE AS NEEDED
Status: DISCONTINUED | OUTPATIENT
Start: 2023-02-09 | End: 2023-02-09 | Stop reason: HOSPADM

## 2023-02-09 RX ORDER — OXYCODONE HYDROCHLORIDE AND ACETAMINOPHEN 5; 325 MG/1; MG/1
1-2 TABLET ORAL EVERY 4 HOURS PRN
Qty: 42 TABLET | Refills: 0 | Status: SHIPPED | OUTPATIENT
Start: 2023-02-09

## 2023-02-09 RX ORDER — PROPOFOL 10 MG/ML
VIAL (ML) INTRAVENOUS AS NEEDED
Status: DISCONTINUED | OUTPATIENT
Start: 2023-02-09 | End: 2023-02-09 | Stop reason: SURG

## 2023-02-09 RX ORDER — SODIUM CHLORIDE 450 MG/100ML
100 INJECTION, SOLUTION INTRAVENOUS CONTINUOUS
Status: DISCONTINUED | OUTPATIENT
Start: 2023-02-09 | End: 2023-02-10 | Stop reason: HOSPADM

## 2023-02-09 RX ADMIN — ONDANSETRON 4 MG: 2 INJECTION INTRAMUSCULAR; INTRAVENOUS at 11:38

## 2023-02-09 RX ADMIN — CLINDAMYCIN PHOSPHATE 900 MG: 900 INJECTION, SOLUTION INTRAVENOUS at 20:41

## 2023-02-09 RX ADMIN — PROPOFOL 50 MG: 10 INJECTION, EMULSION INTRAVENOUS at 10:46

## 2023-02-09 RX ADMIN — FENTANYL CITRATE 25 MCG: 50 INJECTION, SOLUTION INTRAMUSCULAR; INTRAVENOUS at 11:50

## 2023-02-09 RX ADMIN — OXYCODONE AND ACETAMINOPHEN 1 TABLET: 5; 325 TABLET ORAL at 20:46

## 2023-02-09 RX ADMIN — MUPIROCIN 1 APPLICATION: 20 OINTMENT TOPICAL at 20:40

## 2023-02-09 RX ADMIN — FENTANYL CITRATE 50 MCG: 50 INJECTION, SOLUTION INTRAMUSCULAR; INTRAVENOUS at 10:12

## 2023-02-09 RX ADMIN — DEXAMETHASONE SODIUM PHOSPHATE 8 MG: 4 INJECTION, SOLUTION INTRAMUSCULAR; INTRAVENOUS at 10:17

## 2023-02-09 RX ADMIN — FENTANYL CITRATE 25 MCG: 50 INJECTION, SOLUTION INTRAMUSCULAR; INTRAVENOUS at 12:04

## 2023-02-09 RX ADMIN — FENTANYL CITRATE 25 MCG: 50 INJECTION, SOLUTION INTRAMUSCULAR; INTRAVENOUS at 10:41

## 2023-02-09 RX ADMIN — CEFAZOLIN SODIUM 2 G: 2 INJECTION, SOLUTION INTRAVENOUS at 10:02

## 2023-02-09 RX ADMIN — ROPIVACAINE HYDROCHLORIDE 20 ML: 5 INJECTION, SOLUTION EPIDURAL; INFILTRATION; PERINEURAL at 09:15

## 2023-02-09 RX ADMIN — CELECOXIB 200 MG: 200 CAPSULE ORAL at 08:48

## 2023-02-09 RX ADMIN — FENTANYL CITRATE 50 MCG: 0.05 INJECTION, SOLUTION INTRAMUSCULAR; INTRAVENOUS at 09:08

## 2023-02-09 RX ADMIN — HYDROCODONE BITARTRATE AND ACETAMINOPHEN 1 TABLET: 7.5; 325 TABLET ORAL at 13:08

## 2023-02-09 RX ADMIN — PROPOFOL 50 MG: 10 INJECTION, EMULSION INTRAVENOUS at 10:36

## 2023-02-09 RX ADMIN — ROCURONIUM BROMIDE 40 MG: 50 INJECTION INTRAVENOUS at 10:13

## 2023-02-09 RX ADMIN — SUGAMMADEX 200 MG: 100 INJECTION, SOLUTION INTRAVENOUS at 11:39

## 2023-02-09 RX ADMIN — PROPOFOL 50 MG: 10 INJECTION, EMULSION INTRAVENOUS at 10:41

## 2023-02-09 RX ADMIN — CEFAZOLIN 2 G: 225 INJECTION, POWDER, FOR SOLUTION INTRAMUSCULAR; INTRAVENOUS at 11:35

## 2023-02-09 RX ADMIN — LIDOCAINE HYDROCHLORIDE 100 MG: 20 INJECTION, SOLUTION INFILTRATION; PERINEURAL at 10:13

## 2023-02-09 RX ADMIN — Medication 10 ML: at 20:40

## 2023-02-09 RX ADMIN — MIDAZOLAM 0.5 MG: 1 INJECTION INTRAMUSCULAR; INTRAVENOUS at 09:06

## 2023-02-09 RX ADMIN — ACETAMINOPHEN 1000 MG: 500 TABLET, FILM COATED ORAL at 08:48

## 2023-02-09 RX ADMIN — SODIUM CHLORIDE, POTASSIUM CHLORIDE, SODIUM LACTATE AND CALCIUM CHLORIDE 9 ML/HR: 600; 310; 30; 20 INJECTION, SOLUTION INTRAVENOUS at 08:18

## 2023-02-09 RX ADMIN — FENTANYL CITRATE 25 MCG: 50 INJECTION, SOLUTION INTRAMUSCULAR; INTRAVENOUS at 11:00

## 2023-02-09 RX ADMIN — FINASTERIDE 5 MG: 5 TABLET, FILM COATED ORAL at 20:39

## 2023-02-09 RX ADMIN — FAMOTIDINE 20 MG: 10 INJECTION INTRAVENOUS at 09:09

## 2023-02-09 RX ADMIN — FENTANYL CITRATE 25 MCG: 50 INJECTION, SOLUTION INTRAMUSCULAR; INTRAVENOUS at 10:46

## 2023-02-09 RX ADMIN — MIDAZOLAM 1.5 MG: 1 INJECTION INTRAMUSCULAR; INTRAVENOUS at 09:11

## 2023-02-09 RX ADMIN — PROPOFOL 200 MG: 10 INJECTION, EMULSION INTRAVENOUS at 10:13

## 2023-02-09 RX ADMIN — TRANEXAMIC ACID 1000 MG: 1 INJECTION, SOLUTION INTRAVENOUS at 10:18

## 2023-02-09 RX ADMIN — BUDESONIDE AND FORMOTEROL FUMARATE DIHYDRATE 2 PUFF: 160; 4.5 AEROSOL RESPIRATORY (INHALATION) at 21:18

## 2023-02-09 RX ADMIN — TAMSULOSIN HYDROCHLORIDE 0.8 MG: 0.4 CAPSULE ORAL at 20:39

## 2023-02-09 RX ADMIN — FENTANYL CITRATE 25 MCG: 50 INJECTION, SOLUTION INTRAMUSCULAR; INTRAVENOUS at 12:11

## 2023-02-09 NOTE — ANESTHESIA PROCEDURE NOTES
Airway  Urgency: elective    Date/Time: 2/9/2023 10:16 AM  Airway not difficult    General Information and Staff    Patient location during procedure: OR  Anesthesiologist: Les Felix MD  CRNA/CAA: Jacqueline Curran CRNA    Indications and Patient Condition  Indications for airway management: airway protection    Preoxygenated: yes  MILS not maintained throughout  Mask difficulty assessment: 2 - vent by mask + OA or adjuvant +/- NMBA    Final Airway Details  Final airway type: endotracheal airway      Successful airway: ETT  Cuffed: yes   Successful intubation technique: direct laryngoscopy  Facilitating devices/methods: intubating stylet and anterior pressure/BURP  Endotracheal tube insertion site: oral  Blade: Ivan  Blade size: 4  ETT size (mm): 7.5  Cormack-Lehane Classification: grade IIa - partial view of glottis  Placement verified by: chest auscultation and capnometry   Measured from: lips  ETT/EBT  to lips (cm): 23  Number of attempts at approach: 1  Assessment: lips, teeth, and gum same as pre-op and atraumatic intubation

## 2023-02-09 NOTE — ANESTHESIA PROCEDURE NOTES
Peripheral Block      Patient reassessed immediately prior to procedure    Start time: 2/9/2023 9:15 AM  Stop time: 2/9/2023 9:17 AM  Reason for block: at surgeon's request and post-op pain management  Performed by  Anesthesiologist: Les Felix MD  Preanesthetic Checklist  Completed: patient identified, risks and benefits discussed, surgical consent, pre-op evaluation and timeout performed  Prep:  Pt Position: supine  Sterile barriers:cap, gloves, mask and washed/disinfected hands  Prep: ChloraPrep  Patient monitoring: blood pressure monitoring, continuous pulse oximetry and EKG  Procedure    Sedation: yes    Guidance:ultrasound guided    ULTRASOUND INTERPRETATION.  Using ultrasound guidance a 22 G gauge needle was placed in close proximity to the nerve, at which point, under ultrasound guidance anesthetic was injected in the area of the nerve and spread of the anesthesia was seen on ultrasound in close proximity thereto.  There were no abnormalities seen on ultrasound; a digital image was taken; and the patient tolerated the procedure with no complications. Images:still images obtained, printed/placed on chart    Laterality:left  Block Type:adductor canal block  Injection Technique:single-shot  Needle Type:echogenic  Needle Gauge:22 G      Medications Used: ropivacaine (NAROPIN) 0.5 % injection - Injection   20 mL - 2/9/2023 9:15:00 AM      Post Assessment  Injection Assessment: negative aspiration for heme, no paresthesia on injection and incremental injection  Patient Tolerance:comfortable throughout block  Complications:no  Additional Notes  ULTRASOUND INTERPRETATION. Using ultrasound guidance theneedle was placed in close proximity to the nerve, at which point, under ultrasound guidance, local anesthetic was injected around but not in the nerve and spread of the anesthesia was seen on ultrasound in close proximity thereto.  There were no abnormalities seen on ultrasound; a digital image was taken; and  the patient tolerated the procedure with no complications.

## 2023-02-09 NOTE — DISCHARGE SUMMARY
"  Orthopaedic Surgery Discharge Summary    Patient Name:  Juan Blanca  YOB: 1945  Age: 77 y.o.  Medical Records Number:  [id number]    Date of Admission:  2/9/2023  Date of Discharge:  2/10/2023    Primary Discharge Diagnosis:  Primary osteoarthritis of left knee [M17.12]    Secondary Discharge Diagnosis:    Problems Addressed this Visit        Musculoskeletal and Injuries    * (Principal) Primary osteoarthritis of left knee - Primary    Relevant Medications    oxyCODONE-acetaminophen (PERCOCET) 5-325 MG per tablet   Diagnoses       Codes Comments    Primary osteoarthritis of left knee    -  Primary ICD-10-CM: M17.12  ICD-9-CM: 715.16           Procedures Performed:  Left Total Knee Arthroplasty      Hospital Course:    Juan Blanca is a 77 y.o.  male who underwent successful left tka on 2/9/2023.  Juan Blanca was started on Aspirin 325 mg po twice daily post-operatively for DVT prophylaxis.  On post-op day 1 the patients dressing was changed and their incision was clean, with no signs of infection and their calf was soft, with no signs of DVT.  The patient progressed well with physical therapy and the patients hemoglobin remained stable. On post-operative day 1 the patient was felt ready for discharge.     Vitals:  Vitals:    02/09/23 1345 02/09/23 1435 02/09/23 1501 02/09/23 1535   BP: 149/73 141/78  150/80   BP Location:  Right arm  Right arm   Patient Position:  Lying  Lying   Pulse: 100 82  85   Resp: 16 16  16   Temp: 98.7 °F (37.1 °C) 98.5 °F (36.9 °C)  98.1 °F (36.7 °C)   TempSrc: Oral Oral  Oral   SpO2: 96% 97%  97%   Weight:   112 kg (246 lb 14.6 oz)    Height:   182.9 cm (72\")        Discharge Medications:      Discharge Medications      New Medications      Instructions Start Date   aspirin  MG tablet  Replaces: aspirin 81 MG chewable tablet   325 mg, Oral, 2 Times Daily With Meals   Start Date: February 10, 2023     docusate sodium 250 MG capsule  Commonly known " as: COLACE   250 mg, Oral, 2 Times Daily PRN      oxyCODONE-acetaminophen 5-325 MG per tablet  Commonly known as: PERCOCET   1-2 tablets, Oral, Every 4 Hours PRN         Continue These Medications      Instructions Start Date   celecoxib 200 MG capsule  Commonly known as: CeleBREX   1 capsule, Oral, Daily, FOLLOW DRS ORDERS      dutasteride 0.5 MG capsule  Commonly known as: AVODART   1 capsule, Oral, Nightly      ezetimibe 10 MG tablet  Commonly known as: ZETIA   10 mg, Oral, Daily      fenofibrate 160 MG tablet   1 tablet, Oral, Daily      Januvia 100 MG tablet  Generic drug: SITagliptin   1 tablet, Oral, Daily      potassium citrate 10 MEQ (1080 MG) CR tablet  Commonly known as: UROCIT-K   2 tablets, Oral, 3 Times Daily      simvastatin 20 MG tablet  Commonly known as: ZOCOR   20 mg, Oral, Daily      Symbicort 160-4.5 MCG/ACT inhaler  Generic drug: budesonide-formoterol   2 puffs, Inhalation, 2 Times Daily      tamsulosin 0.4 MG capsule 24 hr capsule  Commonly known as: FLOMAX   2 capsules, Oral, Nightly      Unable to find   1 each, Oral, Daily, AMLODIPINE OLMESMEDOXONE 5-40MG         Stop These Medications    aspirin 81 MG chewable tablet  Replaced by: aspirin  MG tablet     Chlorhexidine Gluconate Cloth 2 % pads            Pain Medications:  Percocet 5/325 mg 1-2 po q 4-6 hours prn pain    DVT Prophylaxis:  Enteric Coated Aspirin 325 mg po twice daily for 2 weeks, then one daily for 4 weeks      Total Knee Joint Replacement Discharge Instructions:    I. ACTIVITIES:  1. Exercises:  ? Complete exercise program as taught by the hospital physical therapist 2 times per day  ? Exercise program will be advanced by the physical therapist  ? During the day be up ambulating every 2 hours (while awake) for short distances  ? Complete the ankle pump exercises at least 10 times per hour (while awake)  ? Elevate legs most of the day the first week post operatively and thereafter elevate legs when in bed and for at  least 30 minutes during the day. Caution must be taken to avoid pillow placement under the bend of the knee as this can led to flexion contractures of the knee.  ? Use cold packs 20-30 minutes approximately 5 times per day. This should be done before and after completing your exercises and at any time you are experiencing pain/ stiffness in your operative extremity.      2. Activities of Daily Living:  ? No tub baths, hot tubs, or swimming pools for 4 weeks  ? May shower and let water run over the incision on post-operative day #5 if no drainage. Do not scrub or rub the incision. Simply let the water run over the incision and pat dry.    II. Restrictions  ? Do not cross legs or kneel  ? Your surgeon will discuss with you when you will be able to drive again.  ? Weight bearing is as tolerated  ? First week stay inside on even terrain. May go up and down stairs one stair at a time utilizing the hand rail  ? After one week, you may venture outside.    III. Precautions:  ? Everyone that comes near you should wash their hands  ? No elective dental, genital-urinary, or colon procedures or surgical procedures for 12 weeks after surgery unless absolutely necessary.  ?  If dental work or surgical procedure is deemed absolutely necessary, you will need to contact your surgeon as you will need to take antibiotics 1 hour prior to any dental work (including teeth cleanings).  ? Please discuss with your surgeon prophylactic antibiotics as the length of time this intervention will be necessary for you varies with each patient’s health history and situation.  ? Avoid sick people. If you must be around someone who is ill, they should wear a mask.  ? Avoid visits to the Emergency Room or Urgent Care unless you are having a life threatening event.   ? If ordered stockings are to be placed on in the morning and removed at night. Monitor the stockings to ensure that any swelling is not causing the stockings to become too tight. In this  case, remove stockings immediately.    IV. INCISION CARE:  ? Wash your hands prior to dressing changes  ? Change the dressing as needed to keep incision clean and dry. Utilize dry gauze and paper tape. Avoid touching the side of the gauze that goes against the incision with your hands.  ? No creams or ointments to the incision  ? May remove dressing once the incision is free of drainage  ? Do not touch or pick at the incision  ? Check incision every day and notify surgeon immediately if any of the following signs or symptoms are noted:  o Increase in redness  o Increase in swelling around the incision and of the entire extremity  o Increase in pain  o Drainage oozing from the incision  o Pulling apart of the edges of the incision  o Increase in overall body temperature (greater than 100.5 degrees)  ? Your surgeon will instruct you regarding suture or staple removal    V. Medications:   1. Anticoagulants: You will be discharged on an anticoagulant. This is a prophylactic medication that helps prevent blood clots during your post-operative period. The type and length of dosage varies based on your individual needs, procedure performed, and surgeon’s preference.  ? While taking the anticoagulant, you should avoid taking any additional aspirin, ibuprofen (Advil or Motrin), Aleve (Naprosyn) or other non-steroidal anti-inflammatory medications.   ? Notify surgeon immediately if any kelsi bleeding is noted in the urine, stool, emesis, or from the nose or the incision. Blood in the stool will often appear as black rather than red. Blood in urine may appear as pink. Blood in emesis may appear as brown/black like coffee grounds.  ? You will need to apply pressure for longer periods of time to any cuts or abrasions to stop bleeding  ? Avoid alcohol while taking anticoagulants    2. Stool Softeners: You will be at greater risk of constipation after surgery due to being less mobile and the pain medications.   ? Take stool  softeners as instructed by your surgeon while on pain medications. Over the counter Colace 100 mg 1-2 capsules twice daily.   ? If stools become too loose or too frequent, please decreases the dosage or stop the stool softener.  ? If constipation occurs despite use of stool softeners, you are to continue the stool softeners and add a laxative (Milk of Magnesia 1 ounce daily as needed)  ? Drink plenty of fluids, and eat fruits and vegetables during your recovery time    3. Pain Medications utilized after surgery are narcotics and the law requires that the following information be given to all patients that are prescribed narcotics:  ? CLASSIFICATION: Pain medications are called Opioids and are narcotics  ? LEGALITIES: It is illegal to share narcotics with others and to drive within 24 hours of taking narcotics  ? POTENTIAL SIDE EFFECTS: Potential side effects of opioids include: nausea, vomiting, itching, dizziness, drowsiness, dry mouth, constipation, and difficulty urinating.  ? POTENTIAL ADVERSE EFFECTS:   o Opioid tolerance can develop with use of pain medications and this simply means that it requires more and more of the medication to control pain; however, this is seen more in patients that use opioids for longer periods of time.  o Opioid dependence can develop with use of Opioids and this simply means that to stop the medication can cause withdrawal symptoms; however, this is seen with patients that use Opioids for longer periods of time.  o Opioid addiction can develop with use of Opioids and the incidence of this is very unlikely in patients who take the medications as ordered and stop the medications as instructed.  o Opioid overdose can be dangerous, but is unlikely when the medication is taken as ordered and stopped when ordered. It is important not to mix opioids with alcohol or with and type of sedative such as Benadryl as this can lead to over sedation and respiratory difficulty.  ? DOSAGE:   o Pain  medications will need to be taken consistently for the first week to decrease pain and promote adequate pain relief and participation in physical therapy.  o After the initial surgical pain begins to resolve, you may begin to decrease the pain medication. By the end of 6-8 weeks, you should be off of pain medications.  o Refills will not be given by the office during evening hours, on weekends, or after 6-8 weeks post-op.  o To seek refills on pain medications during the initial 6 week post-operative period, you must call the office 48 hours in advance to request the refill. The office will then notify you when to  the prescription. DO NOT wait until you are out of the medication to request a refill.    V. FOLLOW-UP VISITS:  ? You will need to follow up in the office with your surgeon in 3 weeks. Please call this number [phone] to schedule this appointment.  If you have any concerns or suspected complications prior to your follow up visit, please call your surgeons office. Do not wait until your appointment time if you suspect complications. These will need to be addressed in the office promptly.    Dawson Yen PA-C  [facility]  [address] [place] [id number]  [phone]    2/9/2023    CC:Harpreet Blanca MD:Dawson Haney MD

## 2023-02-09 NOTE — H&P
Orthopaedic Surgery History and Physical    Patient Name:  Juan Blanca  YOB: 1945   Age: 77 y.o.  Medical Records Number:  [id number]    Date of Admission:  2023  7:26 AM    Chief Complaint:  LEFT TOTAL KNEE ARTHROPLAS    Juan Blanca is a 77 y.o. male who presents c/o severe left knee pain.  The pain has been on and off for many years, worsening to the point where the pain is becoming disabling.  The pain is a constant dull ache with occasional sharp, stabbing pain.  The patient has failed conservative treatment and would like to proceed with left total knee arthroplasty.    /81 (BP Location: Right arm, Patient Position: Sitting)   Pulse 77   Temp 98 °F (36.7 °C) (Oral)   Resp 16   SpO2 96%     Past Medical History:    Past Medical History:   Diagnosis Date   • Asthma     AS A CHILD   • BPH (benign prostatic hyperplasia)    • Diabetes (HCC)    • History of kidney stones    • Hyperlipidemia    • Hypertension    • Osteoarthritis     LEFT KNEE   • Seasonal allergies        Past Surgical History:   Past Surgical History:   Procedure Laterality Date   • COLONOSCOPY     • EXTRACORPOREAL SHOCK WAVE LITHOTRIPSY (ESWL)     • PAROTID BIOPSY/TUMOR EXCISION Right    • TONSILLECTOMY         Social History:    Social History     Socioeconomic History   • Marital status:    Tobacco Use   • Smoking status: Former     Types: Cigarettes     Quit date:      Years since quittin.1   • Smokeless tobacco: Current     Types: Chew   Vaping Use   • Vaping Use: Never used   Substance and Sexual Activity   • Alcohol use: Yes     Comment: OCCASSIONALLY   • Drug use: Never   • Sexual activity: Defer       Family History:    Family History   Problem Relation Age of Onset   • Malig Hyperthermia Neg Hx        Current Medications:  Scheduled Meds:acetaminophen, 1,000 mg, Oral, Once  ceFAZolin, 2 g, Intravenous, Once  celecoxib, 200 mg, Oral, Once  ethyl alcohol, 2 swab, Nasal,  Once  famotidine, 20 mg, Intravenous, Once  Orthopedic surgery custom cocktail, , Injection, Once  sodium chloride, 3 mL, Intravenous, Q12H      Continuous Infusions:lactated ringers, 9 mL/hr, Last Rate: 9 mL/hr (02/09/23 0818)      PRN Meds:.•  fentanyl  •  lidocaine PF 1%  •  midazolam  •  sodium chloride    Current Facility-Administered Medications:   •  acetaminophen (TYLENOL) tablet 1,000 mg, 1,000 mg, Oral, Once, Dawson Haney MD  •  ceFAZolin in dextrose (ANCEF) IVPB solution 2 g, 2 g, Intravenous, Once, Dawson Haney MD  •  celecoxib (CeleBREX) capsule 200 mg, 200 mg, Oral, Once, Dawson Haney MD  •  ethyl alcohol 62 % 2 each, 2 swab, Nasal, Once, Dawson Haney MD  •  famotidine (PEPCID) injection 20 mg, 20 mg, Intravenous, Once, Les Felix MD  •  fentaNYL citrate (PF) (SUBLIMAZE) injection 50 mcg, 50 mcg, Intravenous, Q10 Min PRN, Les Felix MD  •  lactated ringers infusion, 9 mL/hr, Intravenous, Continuous, Les Felix MD, Last Rate: 9 mL/hr at 02/09/23 0818, 9 mL/hr at 02/09/23 0818  •  lidocaine PF 1% (XYLOCAINE) injection 0.5 mL, 0.5 mL, Injection, Once PRN, Les Felix MD  •  midazolam (VERSED) injection 0.5 mg, 0.5 mg, Intravenous, Q10 Min PRN, Les Felix MD  •  ropivacaine 0.5 % 50 mL, Morphine 5 mg, ketorolac 30 mg, EPINEPHrine 1 mg/mL 0.3 mg in sodium chloride 0.9 % 50 mL solution, , Injection, Once, Dawson Haney MD  •  sodium chloride 0.9 % flush 3 mL, 3 mL, Intravenous, Q12H, Les Felix MD  •  sodium chloride 0.9 % flush 3-10 mL, 3-10 mL, Intravenous, PRN, Les Felix MD    Allergies:    Allergies   Allergen Reactions   • Moxifloxacin Anaphylaxis     Difficulty breathing, swelling and heat in bilateral hands       Review of Systems:   HEENT: Patient denies any headaches, vision changes, change in hearing, or tinnitus, Patient denies any rhinorrhea,epistaxis, sinus pain, mouth or dental problems, sore throat or  hoarseness, or dysphagia  Pulmonary: Patient denies any cough, congestion, SOA, or wheezing  Cardiovascular: Patient denies any chest pain, dyspnea, palpitations, weakness, intolerance of exercise, varicosities, swelling of extremities, known murmur  Gastrointestinal:  Patient denies nausea, vomiting, diarrhea, constipation, loss  of appetite, change in appetite, dysphagia, gas, heartburn, melena, change in bowel habits, use of laxatives or other drugs to alter the function of the gastrointestinal tract.  Genital/Urinary: Patient denies dysuria, change in color of urine, change in frequency of urination, pain with urgency, incontinence, retention, or nocturia.  Musculoskeletal: Patient denies increased warmth; redness; or swelling of joints; limitation of function; deformity; crepitation: pain in a joint or an extremity, the neck, or the back, especially with movement.  Neurological: Patient denies dizziness, tremor, ataxia, difficulty in speaking, change in speech, paresthesia, loss of sensation, seizures, syncope, changes in memory.  Endocrine system: Patient denies tremors, palpitations, intolerance of heat or cold, polyuria, polydipsia, polyphagia, diaphoresis, exophthalmos, or goiter.  Psychological: Patient denies thoughts/plans or harming self or other; depression,  insomnia, night terrors, mina, memory loss, disorientation.  Skin: Patient denies any bruising, rashes, discoloration, pruritus, wounds, ulcers, decubiti, changes in the hair or nails  Hematopoietic: Patient denies history of spontaneous or excessive bleeding, epistaxis, hematuria, melena, fatigue, enlarged or tender lymph nodes, pallor, history of anemia.        Physical Exam:   Awake, A&O x3, affect normal, no acute distress  Ambulating with a limp due to knee pain  Knee ROM is limited due to pain (5-115)  Strength is 4/5 in the quad, hamstring and calf  Cap refill is normal, Sensation intact    Card:  RR, HD Stable  Pulm:  Regular breathing,  no S.O.A  Abd:  Soft, NT, ND    Lab Results (last 24 hours)     Procedure Component Value Units Date/Time    POC Glucose Once [[id number]]  (Abnormal) Collected: 02/09/23 0814    Specimen: Blood Updated: 02/09/23 0815     Glucose 148 mg/dL      Comment: Meter: [id number] : [id number] Yuri MULLER RN             XR Chest PA & Lateral, XR Knee 1 or 2 View Left    Result Date: 1/27/2023  Narrative: XR CHEST PA AND LATERAL-, XR KNEE 1 OR 2 VW LEFT-clinical: Hypertension preop knee surgery  STANDING VIEWS LEFT KNEE FINDINGS: Medial compartment joint space loss with a bone-on-bone configuration and articular irregularity. There is narrowing of the lateral compartment with periarticular bone hypertrophy. There is exuberant bone hypertrophy and spur formation about the patellofemoral joint. No bone lesion, joint effusion or fracture.  CONCLUSION: Substantial left knee joint degeneration as discussed above.  Chest findings: There is clothing artifact superimposing the lung apices. There is a 6 mm nodular density superimposing the right first rib at the lung apex. It is indeterminant.  Cardiac size within normal limits. No mediastinal abnormality. No pleural effusion nor vascular congestion seen.  There is a fairly dense 12 x 5 mm density at the left lung base, this probably calcified and likely benign. This may simply represent a granuloma.  CONCLUSION: 1. Probably benign likely calcified 12 x 5 mm nodular density at the left base. 2. Clothing artifact superimposing the lung apices with a 6 mm indeterminate area of nodularity superimposing the right first rib. 3. No active pulmonary process is demonstrated.  I would recommend follow-up PA and lateral chest radiograph to include apical lordotic views (in a hospital gown) in 4-6 weeks for further evaluation.  This report was finalized on 1/27/2023 3:48 PM by Dr. Dylan Parham M.D.          Assessment:  End-stage Primary Left Knee Osteoarthritis    Plan:  Patient's pain is  becoming disabling, despite extensive conservative treatment.  Radiographs reveal end-stage degenerative changes.  The risks of surgery, including, but not limited to, heart attack, stroke, dying, DVT, nerve injury, vascular injury, arthrofibrosis and infection were discussed.  The alternatives and benefits were also discussed.  All questions answered and the patient wishes to proceed with left total knee arthroplasty.    Dawson Yen PA-C  [facility] [place] [id number] [phone]    2/9/2023    CC: Harpreet Blanca MD, Dawson Haney MD

## 2023-02-09 NOTE — OP NOTE
Orthopaedic Surgery Operative Note    Patient Name:  Juan Blanca  YOB: 1945  Age: 77 y.o.  Medical Records Number:  [id number]    Date of Procedure:  2/9/2023    Pre-operative Diagnosis:  Primary Osteoarthritis Left Knee    Post-operative Diagnosis:  Primary Osteoarthritis Left Knee    Procedure Performed:  Left Total Knee Arthroplasty    Implants:  Biomet Vanguard TKA, 72.5 Femoral Component, 83 Tibial Tray with a 40 mm Modular Stem, 37 3-Peg Patella, 14 mm Anterior Stabilized Polyethylene Insert    Surgeon:  Dawson Haney M.D.    Assistant: Tamika Yen (who was present during the critical portions of the case, thereby decreasing operative time and patient morbidity)    Anesthetic Type:  General    Estimated Blood Loss:  250cc's    Specimens: * No orders in the log *    No Complications      Indications for Procedure:  Juan Blanca is a 77 y.o. male suffering from end stage degenerative changes in the left knee.  The patients pain is becoming disabling, despite extensive conservative care, including NSAIDS, therapy and injections. The risks, benefits and alternatives were discussed and the patient wishes to proceed with left total knee arthroplasty.      Procedure Performed:    After informed consent was obtained, the correct patient identified, the correct operative side marked by the operative surgeon, and pre-operative IV Kefzol  given (prior to tourniquet inflation), the patient was taken to the operating room and placed supine on the operating table.  After general anesthesia was induced, a surgical time out was performed and 1 gm of Tranxemic Acid given, a well-padded tourniquet was placed and the patient's left lower extremity was prepped with chloraprep and draped in a sterile fashion.    A midline incision was made overlying the left knee and we sharply dissected down to expose the parapatellar retinaculum.  A muscle sparing, mid-vastus, parapatellar arthrotomy was  performed and we elevated the soft tissue both medially and laterally.  The menisci and ACL were excised.  We everted the patella and measured this to be 25 mm and we removed 10 mm of bone down to 15 mm.  We measured the patella to be 37 and drilled our three drill holes.  We protected the patella with the patella protector and turned our attention to the femur and the tibia.    We drilled drill holes into the femoral and the tibial intramedullary canals and proceeded to irrigate the canals to remove the fatty marrow.  We used the intramedullary chau and the 5 degree valgus cut block to make our distal femoral cut.  Once we had a smooth surface, we measured the femur to be 72.5.  We assured we had rotational alignment using the epicondylar axis, then we used the four-in-one cut block to make our anterior, posterior, anterior and posterior chamfer cuts.  We placed our femoral trial, had excellent fit, extended the knee and marked Jose's line on the tibia.      We then turned our attention to the tibia, where we irrigated the canal again.  We used the intramedullary chau and the 3 degree posterior sloped cut block to remove 2 mm of bone from the effected side of the tibia.  Prior to making our cut, we assured we had rotational alignment using the external guide and Joffre's line.  Once we had a smooth surface, we measured the tibia to be 83.  We then removed soft tissue and bony debris from the posterior aspect of the knee.    We placed our trial implants and had excellent fit, range of motion, stability and ligament balance, throughout a complete arc of motion with a 14 AS polyethylene liner.  We removed our trial implants, punched the tibial keel, copiously irrigated the knee, then cemented our implants in place using Biomet cement.  Once the cement cured, we trialed again with the 12, 14 and 16 AS, standard and posterior lipped polyethylene liners.  The 14 AS gave us the best range of motion, stability and  "ligament balance, throughout a complete arc of motion.  We removed the trials, copiously irrigated the knee with dilute betadine solution, gave IV antibiotics and local anesthetic, then placed our permanent polyethylene liner.  We placed a 1/8\" hemovac drain, then closed the arthrotomy with #1 Vicryl pop-off sutures.  The subcutaneous tissue was closed with 2-0 vicryl pop-off sutures.  The skin was closed with staples.  We placed a sterile dressing of Xeroform, 4x4's, abdominal pads, cast padding and an Ace Wrap.  All sponge and needle counts were correct.  The patient was then awakened from general anesthesia and taken to the recovery room in stable condition.    The patient will be started on Aspirin 325 mg twice daily for DVT prophylaxis.  IV antibiotics will be discontinued within 24 hours of surgery.  Immediately prior to surgery, there were no acute Thromboembolic nor Cardiovascular risk factors.  An updated Medical Reconciliation form is on the chart.    Dawson Yen PA-C  [facility]  [address] [place] [address]  [phone]    2/9/2023        "

## 2023-02-09 NOTE — THERAPY EVALUATION
Patient Name: Juan Blanca  : 1945    MRN: [id number]                              Today's Date: 2023       Admit Date: 2023    Visit Dx: No diagnosis found.  Patient Active Problem List   Diagnosis   • Primary osteoarthritis of left knee     Past Medical History:   Diagnosis Date   • Asthma     AS A CHILD   • BPH (benign prostatic hyperplasia)    • Diabetes (HCC)    • History of kidney stones    • Hyperlipidemia    • Hypertension    • Osteoarthritis     LEFT KNEE   • Seasonal allergies      Past Surgical History:   Procedure Laterality Date   • COLONOSCOPY     • EXTRACORPOREAL SHOCK WAVE LITHOTRIPSY (ESWL)     • PAROTID BIOPSY/TUMOR EXCISION Right    • TONSILLECTOMY        General Information     Row Name 23 1548          Physical Therapy Time and Intention    Document Type evaluation  -     Mode of Treatment individual therapy;physical therapy  -     Row Name 23 1548          General Information    Patient Profile Reviewed yes  -     Prior Level of Function independent:  -     Existing Precautions/Restrictions fall  -     Row Name 23 1548          Living Environment    People in Home spouse  -     Row Name 23 1548          Home Main Entrance    Number of Stairs, Main Entrance none  -     Row Name 23 1548          Cognition    Orientation Status (Cognition) oriented x 4  -     Row Name 23 1548          Safety Issues, Functional Mobility    Impairments Affecting Function (Mobility) pain;strength;endurance/activity tolerance  -           User Key  (r) = Recorded By, (t) = Taken By, (c) = Cosigned By    Initials Name Provider Type     Katherine Turner PT Physical Therapist               Mobility     Row Name 23 1549          Bed Mobility    Bed Mobility supine-sit  -     Supine-Sit [place] (Bed Mobility) standby assist  -     Assistive Device (Bed Mobility) head of bed elevated  -     Row Name 23 1549           Sit-Stand Transfer    Sit-Stand [place] (Transfers) standby assist  -     Assistive Device (Sit-Stand Transfers) walker, front-wheeled  -     Row Name 02/09/23 1549          Gait/Stairs (Locomotion)    [place] Level (Gait) standby assist;contact guard  -     Assistive Device (Gait) walker, front-wheeled  -     Distance in Feet (Gait) 75ft  -     Deviations/Abnormal Patterns (Gait) gait speed decreased;antalgic  -     [place] Level (Stairs) not tested  -     Comment, (Gait/Stairs) Gait slow and mildly antalgic though patient able to use step through gait pattern. No overt LOB noted.  -           User Key  (r) = Recorded By, (t) = Taken By, (c) = Cosigned By    Initials Name Provider Type     Katherine Turner PT Physical Therapist               Obj/Interventions     Row Name 02/09/23 1549          Range of Motion Comprehensive    Comment, General Range of Motion L knee flexion 0-90 degrees  -     Row Name 02/09/23 1549          Strength Comprehensive (MMT)    General Manual Muscle Testing (MMT) Assessment lower extremity strength deficits identified  -     Comment, General Manual Muscle Testing (MMT) Assessment Generalized post op weakness  -     Row Name 02/09/23 1549          Motor Skills    Therapeutic Exercise knee  TKA post op protocol  -     Row Name 02/09/23 1549          Balance    Balance Assessment sitting static balance;sitting dynamic balance;sit to stand dynamic balance;standing static balance;standing dynamic balance  -     Static Sitting Balance independent  -     Dynamic Sitting Balance modified independence  -     Position, Sitting Balance sitting edge of bed  -     Sit to Stand Dynamic Balance standby assist  -     Static Standing Balance standby assist  -     Dynamic Standing Balance standby assist;contact guard  -     Position/Device Used, Standing Balance supported;walker, front-wheeled  -     Balance Interventions sitting;standing;sit to  stand;supported;static;dynamic  -SM           User Key  (r) = Recorded By, (t) = Taken By, (c) = Cosigned By    Initials Name Provider Type     Katherine Turner PT Physical Therapist               Goals/Plan     Row Name 02/09/23 1553          Bed Mobility Goal 1 (PT)    Activity/Assistive Device (Bed Mobility Goal 1, PT) bed mobility activities, all  -SM     [place] Level/Cues Needed (Bed Mobility Goal 1, PT) modified independence  -SM     Time Frame (Bed Mobility Goal 1, PT) 1 week  -SM     Row Name 02/09/23 1553          Transfer Goal 1 (PT)    Activity/Assistive Device (Transfer Goal 1, PT) sit-to-stand/stand-to-sit;bed-to-chair/chair-to-bed  -SM     [place] Level/Cues Needed (Transfer Goal 1, PT) supervision required  -SM     Time Frame (Transfer Goal 1, PT) 1 week  -[facility] Name 02/09/23 1553          Gait Training Goal 1 (PT)    Activity/Assistive Device (Gait Training Goal 1, PT) gait (walking locomotion);walker, rolling  -SM     [place] Level (Gait Training Goal 1, PT) standby assist  -SM     Distance (Gait Training Goal 1, PT) 150ft  -SM     Time Frame (Gait Training Goal 1, PT) 1 week  -[facility] Name 02/09/23 1553          ROM Goal 1 (PT)    ROM Goal 1 (PT) L knee flexion 0-100 degrees  -SM     Time Frame (ROM Goal 1, PT) 1 week  -SM           User Key  (r) = Recorded By, (t) = Taken By, (c) = Cosigned By    Initials Name Provider Type     Katherine Turner PT Physical Therapist               Clinical Impression     Row Name 02/09/23 1550          Pain    Pretreatment Pain Rating 3/10  -SM     Posttreatment Pain Rating 3/10  -SM     Pain Location - Side/Orientation Left  -     Pain Location - knee  -SM     Pain Intervention(s) Rest;Repositioned;Ambulation/increased activity  -     Row Name 02/09/23 1550          Plan of Care Review    Plan of Care Reviewed With patient  -     Outcome Evaluation Patient is a 77 y.o male who presents POD0 L TKA. Patient supine in bed AOx4  upon arrival. Patient lives with his wife with no RAAD. Patient is independent at baseline with ADLs. Patient reports no equipment needs at d/c. Patient educated in and performed TKA post op protocol. Patient sat up to EOB with SBA. Patient performed STS from EOB with SBA. Patient ambulated 75ft with rwx and CGA-SBA. Patient UIC at end of session. Patient would continue to benefit from skilled PT intervention to address maximize safety and independence. Plan is home with assist and HHPT at d/c. PT will continue to monitor.  -     Row Name 02/09/23 1559          Therapy Assessment/Plan (PT)    Rehab Potential (PT) good, to achieve stated therapy goals  -     Criteria for Skilled Interventions Met (PT) yes  -     Therapy Frequency (PT) daily  -     Row Name 02/09/23 9060          Vital Signs    O2 Delivery Pre Treatment room air  -     O2 Delivery Intra Treatment room air  -     O2 Delivery Post Treatment room air  -SM     Pre Patient Position Supine  -     Intra Patient Position Standing  -     Post Patient Position Sitting  -     Row Name 02/09/23 9098          Positioning and Restraints    Pre-Treatment Position in bed  -     Post Treatment Position chair  -SM     In Chair call light within reach;encouraged to call for assist;exit alarm on;reclined;with family/caregiver;notified [facility]           User Key  (r) = Recorded By, (t) = Taken By, (c) = Cosigned By    Initials Name Provider Type     Katherine Turner, PT Physical Therapist               Outcome Measures     Row Name 02/09/23 1554 02/09/23 1508       How much help from another person do you currently need...    Turning from your back to your side while in flat bed without using bedrails? 4  -SM 4  -KM    Moving from lying on back to sitting on the side of a flat bed without bedrails? 4  -SM 4  -KM    Moving to and from a bed to a chair (including a wheelchair)? 3  -SM 3  -KM    Standing up from a chair using your arms (e.g.,  wheelchair, bedside chair)? 4  -SM 4  -KM    Climbing 3-5 steps with a railing? 3  -SM 3  -KM    To walk in hospital room? 3  -SM 3  -KM    AM-PAC 6 Clicks Score (PT) 21  - 21  -KM    Highest level of mobility 6 --> Walked 10 steps or more  - 6 --> Walked 10 steps or more  -KM    Row Name 02/09/23 1554          Functional Assessment    Outcome Measure Options AM-PAC 6 Clicks Basic Mobility (PT)  -           User Key  (r) = Recorded By, (t) = Taken By, (c) = Cosigned By    Initials Name Provider Type    Carmen Mckeon, RN Registered Nurse    Katherine Gonzales PT Physical Therapist                             Physical Therapy Education     Title: PT OT SLP Therapies (Done)     Topic: Physical Therapy (Done)     Point: Mobility training (Done)     Learning Progress Summary           Patient Acceptance, E, VU by  at 2/9/2023 1555                   Point: Home exercise program (Done)     Learning Progress Summary           Patient Acceptance, E, VU by  at 2/9/2023 1555                   Point: Body mechanics (Done)     Learning Progress Summary           Patient Acceptance, E, VU by  at 2/9/2023 1555                   Point: Precautions (Done)     Learning Progress Summary           Patient Acceptance, E, VU by  at 2/9/2023 1555                               User Key     Initials Effective Dates Name Provider Type Discipline     05/02/22 -  Katherine Turner PT Physical Therapist PT              PT Recommendation and Plan     Plan of Care Reviewed With: patient  Outcome Evaluation: Patient is a 77 y.o male who presents POD0 L TKA. Patient supine in bed AOx4 upon arrival. Patient lives with his wife with no RAAD. Patient is independent at baseline with ADLs. Patient reports no equipment needs at d/c. Patient educated in and performed TKA post op protocol. Patient sat up to EOB with SBA. Patient performed STS from EOB with SBA. Patient ambulated 75ft with rwx and CGA-SBA. Patient UIC at end of  session. Patient would continue to benefit from skilled PT intervention to address maximize safety and independence. Plan is home with assist and HHPT at d/c. PT will continue to monitor.     Time Calculation:    PT Charges     Row Name 02/09/23 1555             Time Calculation    Start Time 1534  -SM      Stop Time 1546  -SM      Time Calculation (min) 12 min  -SM      PT Received On 02/09/23  -SM      PT - Next Appointment 02/10/23  -SM      PT Goal Re-Cert Due Date 02/16/23  -SM         Time Calculation- PT    Total Timed Code Minutes- PT 8 minute(s)  -SM         Timed Charges    48266 - PT Therapeutic Exercise Minutes 8  -SM         Total Minutes    Timed Charges Total Minutes 8  -SM       Total Minutes 8  -SM            User Key  (r) = Recorded By, (t) = Taken By, (c) = Cosigned By    Initials Name Provider Type    Katherine Gonzales PT Physical Therapist              Therapy Charges for Today     Code Description Service Date Service Provider Modifiers Qty    18945829332 HC PT THER PROC EA 15 MIN 2/9/2023 Katherine Turner, PT GP 1    92027277222 HC PT EVAL LOW COMPLEXITY 3 2/9/2023 Katherine Turner, PT GP 1          PT G-Codes  Outcome Measure Options: AM-PAC 6 Clicks Basic Mobility (PT)  AM-PAC 6 Clicks Score (PT): 21  PT Discharge Summary  Anticipated Discharge Disposition (PT): home with home health, home with assist  Patient was not wearing a face mask during this therapy encounter. Therapist used appropriate personal protective equipment including mask and gloves.  Mask used was standard procedure mask. Appropriate PPE was worn during the entire therapy session. Hand hygiene was completed before and after therapy session. Patient is not in enhanced droplet precautions.     Katherine Turner PT  2/9/2023

## 2023-02-09 NOTE — ANESTHESIA PREPROCEDURE EVALUATION
Anesthesia Evaluation     NPO Solid Status: > 8 hours             Airway   Mallampati: II  TM distance: >3 FB  Neck ROM: full  Dental    (+) implants    Pulmonary - normal exam   (+) asthma,  Cardiovascular - normal exam    (+) hypertension, hyperlipidemia,   (-) past MI      Neuro/Psych  GI/Hepatic/Renal/Endo    (+)   diabetes mellitus,     Musculoskeletal     Abdominal    Substance History      OB/GYN          Other                        Anesthesia Plan    ASA 3     general     (Adductor canal nerve block for postop pain management per surgeon request)    Anesthetic plan, risks, benefits, and alternatives have been provided, discussed and informed consent has been obtained with: patient.        CODE STATUS:

## 2023-02-09 NOTE — ANESTHESIA POSTPROCEDURE EVALUATION
Patient: Juan Blanca    Procedure Summary     Date: 02/09/23 Room / Location:  [facility] [address] [place] [address] [facility]    Anesthesia Start: 1006 Anesthesia Stop: 1213    Procedure: LEFT TOTAL KNEE ARTHROPLASTY (Left: Knee) Diagnosis:     Surgeons: Dawson Haney MD Provider: Les Felix MD    Anesthesia Type: general ASA Status: 3          Anesthesia Type: general    Vitals  Vitals Value Taken Time   /73 02/09/23 1345   Temp 37.1 °C (98.7 °F) 02/09/23 1345   Pulse 83 02/09/23 1400   Resp 16 02/09/23 1345   SpO2 95 % 02/09/23 1400   Vitals shown include unvalidated device data.        Post Anesthesia Care and Evaluation    Patient location during evaluation: bedside  Pain management: adequate    Airway patency: patent  Anesthetic complications: No anesthetic complications    Cardiovascular status: acceptable  Respiratory status: acceptable  Hydration status: acceptable

## 2023-02-09 NOTE — H&P
Orthopaedic Surgery History and Physical    Patient Name:  Juan Blanca  YOB: 1945   Age: 77 y.o.  Medical Records Number:  [id number]    Date of Admission:  No admission date for patient encounter.    Chief Complaint:  No admission diagnoses are documented for this encounter.    Juan Blanca is a 77 y.o. male who presents c/o severe left knee pain.  The pain has been on and off for many years, worsening to the point where the pain is becoming disabling.  The pain is a constant dull ache with occasional sharp, stabbing pain.  The patient has failed conservative treatment and would like to proceed with left total knee arthroplasty.    There were no vitals taken for this visit.    Past Medical History:    Past Medical History:   Diagnosis Date   • Asthma     AS A CHILD   • BPH (benign prostatic hyperplasia)    • Diabetes (HCC)    • History of kidney stones    • Hyperlipidemia    • Hypertension    • Osteoarthritis     LEFT KNEE   • Seasonal allergies        Past Surgical History:   Past Surgical History:   Procedure Laterality Date   • COLONOSCOPY     • EXTRACORPOREAL SHOCK WAVE LITHOTRIPSY (ESWL)     • PAROTID BIOPSY/TUMOR EXCISION Right    • TONSILLECTOMY         Social History:    Social History     Socioeconomic History   • Marital status:    Tobacco Use   • Smoking status: Former     Types: Cigarettes     Quit date:      Years since quittin.1   • Smokeless tobacco: Current     Types: Chew   Vaping Use   • Vaping Use: Never used   Substance and Sexual Activity   • Alcohol use: Yes     Comment: OCCASSIONALLY   • Drug use: Never   • Sexual activity: Defer       Family History:    Family History   Problem Relation Age of Onset   • Malig Hyperthermia Neg Hx        Current Medications:  Scheduled Meds:Orthopedic surgery custom cocktail, , Injection, Once      Continuous Infusions:   PRN Meds:.    Current Facility-Administered Medications:   •  ropivacaine 0.5 % 50 mL,  Morphine 5 mg, ketorolac 30 mg, EPINEPHrine 1 mg/mL 0.3 mg in sodium chloride 0.9 % 50 mL solution, , Injection, Once, Dawson Haney MD    Current Outpatient Medications:   •  aspirin 81 MG chewable tablet, Chew 81 mg Daily. PATIENT STATES HE STOPPED ASA ON 1/27/22 FOR SURGERY, Disp: , Rfl:   •  celecoxib (CeleBREX) 200 MG capsule, Take 1 capsule by mouth Daily. FOLLOW DRS ORDERS, Disp: , Rfl:   •  Chlorhexidine Gluconate Cloth 2 % pads, Apply 1 application topically Take As Directed., Disp: , Rfl:   •  dutasteride (AVODART) 0.5 MG capsule, Take 1 capsule by mouth Every Night., Disp: , Rfl:   •  ezetimibe (ZETIA) 10 MG tablet, Take 10 mg by mouth Daily., Disp: , Rfl:   •  fenofibrate 160 MG tablet, Take 1 tablet by mouth Daily., Disp: , Rfl:   •  Januvia 100 MG tablet, Take 1 tablet by mouth Daily., Disp: , Rfl:   •  potassium citrate (UROCIT-K) 10 MEQ (1080 MG) CR tablet, Take 2 tablets by mouth 3 (Three) Times a Day., Disp: , Rfl:   •  simvastatin (ZOCOR) 20 MG tablet, Take 20 mg by mouth Daily., Disp: , Rfl:   •  Symbicort 160-4.5 MCG/ACT inhaler, Inhale 2 puffs 2 (Two) Times a Day., Disp: , Rfl:   •  tamsulosin (FLOMAX) 0.4 MG capsule 24 hr capsule, Take 2 capsules by mouth Every Night., Disp: , Rfl:   •  Unable to find, Take 1 each by mouth Daily. AMLODIPINE OLMESMEDOXONE 5-40MG, Disp: , Rfl:     Allergies:    Allergies   Allergen Reactions   • Moxifloxacin Anaphylaxis     Difficulty breathing, swelling and heat in bilateral hands       Review of Systems:   HEENT: Patient denies any headaches, vision changes, change in hearing, or tinnitus, Patient denies any rhinorrhea,epistaxis, sinus pain, mouth or dental problems, sore throat or hoarseness, or dysphagia  Pulmonary: Patient denies any cough, congestion, SOA, or wheezing  Cardiovascular: Patient denies any chest pain, dyspnea, palpitations, weakness, intolerance of exercise, varicosities, swelling of extremities, known murmur  Gastrointestinal:  Patient  denies nausea, vomiting, diarrhea, constipation, loss  of appetite, change in appetite, dysphagia, gas, heartburn, melena, change in bowel habits, use of laxatives or other drugs to alter the function of the gastrointestinal tract.  Genital/Urinary: Patient denies dysuria, change in color of urine, change in frequency of urination, pain with urgency, incontinence, retention, or nocturia.  Musculoskeletal: Patient denies increased warmth; redness; or swelling of joints; limitation of function; deformity; crepitation: pain in a joint or an extremity, the neck, or the back, especially with movement.  Neurological: Patient denies dizziness, tremor, ataxia, difficulty in speaking, change in speech, paresthesia, loss of sensation, seizures, syncope, changes in memory.  Endocrine system: Patient denies tremors, palpitations, intolerance of heat or cold, polyuria, polydipsia, polyphagia, diaphoresis, exophthalmos, or goiter.  Psychological: Patient denies thoughts/plans or harming self or other; depression,  insomnia, night terrors, mina, memory loss, disorientation.  Skin: Patient denies any bruising, rashes, discoloration, pruritus, wounds, ulcers, decubiti, changes in the hair or nails  Hematopoietic: Patient denies history of spontaneous or excessive bleeding, epistaxis, hematuria, melena, fatigue, enlarged or tender lymph nodes, pallor, history of anemia.        Physical Exam:   Awake, A&O x3, affect normal, no acute distress  Ambulating with a limp due to knee pain  Knee ROM is limited due to pain (5-115)  Strength is 4/5 in the quad, hamstring and calf  Cap refill is normal, Sensation intact    Card:  RR, HD Stable  Pulm:  Regular breathing, no S.O.A  Abd:  Soft, NT, ND    Lab Results (last 24 hours)     ** No results found for the last 24 hours. **          XR Chest PA & Lateral, XR Knee 1 or 2 View Left    Result Date: 1/27/2023  Narrative: XR CHEST PA AND LATERAL-, XR KNEE 1 OR 2 VW LEFT-clinical: Hypertension  preop knee surgery  STANDING VIEWS LEFT KNEE FINDINGS: Medial compartment joint space loss with a bone-on-bone configuration and articular irregularity. There is narrowing of the lateral compartment with periarticular bone hypertrophy. There is exuberant bone hypertrophy and spur formation about the patellofemoral joint. No bone lesion, joint effusion or fracture.  CONCLUSION: Substantial left knee joint degeneration as discussed above.  Chest findings: There is clothing artifact superimposing the lung apices. There is a 6 mm nodular density superimposing the right first rib at the lung apex. It is indeterminant.  Cardiac size within normal limits. No mediastinal abnormality. No pleural effusion nor vascular congestion seen.  There is a fairly dense 12 x 5 mm density at the left lung base, this probably calcified and likely benign. This may simply represent a granuloma.  CONCLUSION: 1. Probably benign likely calcified 12 x 5 mm nodular density at the left base. 2. Clothing artifact superimposing the lung apices with a 6 mm indeterminate area of nodularity superimposing the right first rib. 3. No active pulmonary process is demonstrated.  I would recommend follow-up PA and lateral chest radiograph to include apical lordotic views (in a hospital gown) in 4-6 weeks for further evaluation.  This report was finalized on 1/27/2023 3:48 PM by Dr. Dylan Parham M.D.          Assessment:  End-stage Primary Left Knee Osteoarthritis    Plan:  Patient's pain is becoming disabling, despite extensive conservative treatment.  Radiographs reveal end-stage degenerative changes.  The risks of surgery, including, but not limited to, heart attack, stroke, dying, DVT, nerve injury, vascular injury, arthrofibrosis and infection were discussed.  The alternatives and benefits were also discussed.  All questions answered and the patient wishes to proceed with left total knee arthroplasty.    Dawson Yen  LARA  [facility] [place] [id number]  [phone]    2/9/2023    CC: Harpreet Blanca MD, Dawson Haney MD

## 2023-02-09 NOTE — PLAN OF CARE
Goal Outcome Evaluation:  Plan of Care Reviewed With: patient           Outcome Evaluation: Patient is a 77 y.o male who presents POD0 L TKA. Patient supine in bed AOx4 upon arrival. Patient lives with his wife with no RAAD. Patient is independent at baseline with ADLs. Patient reports no equipment needs at d/c. Patient educated in and performed TKA post op protocol. Patient sat up to EOB with SBA. Patient performed STS from EOB with SBA. Patient ambulated 75ft with rwx and CGA-SBA. Patient UIC at end of session. Patient would continue to benefit from skilled PT intervention to address maximize safety and independence. Plan is home with assist and HHPT at d/c. PT will continue to monitor.

## 2023-02-09 NOTE — PLAN OF CARE
Goal Outcome Evaluation:  Plan of Care Reviewed With: patient        Progress: improving  Outcome Evaluation: PT IS PO TODAY FROM A LEFT TKA. PAIN WELL CONTROLLED WITH MINIMAL PAIN MEDS. AMBULATES WITH ASSIST OF ONE AND A WALKER. PT IS ON ROOM AIR. PLANS TO D/C HOME WITH HH, ALREADY HAS EQUIPMENT AT HOME. PT EDUCATED REGARDING HTN AND B/P MONITORING.

## 2023-02-10 VITALS
BODY MASS INDEX: 33.44 KG/M2 | DIASTOLIC BLOOD PRESSURE: 58 MMHG | OXYGEN SATURATION: 95 % | SYSTOLIC BLOOD PRESSURE: 128 MMHG | TEMPERATURE: 98.3 F | HEIGHT: 72 IN | WEIGHT: 246.91 LBS | HEART RATE: 78 BPM | RESPIRATION RATE: 18 BRPM

## 2023-02-10 LAB
ANION GAP SERPL CALCULATED.3IONS-SCNC: 12.9 MMOL/L (ref 5–15)
BUN SERPL-MCNC: 20 MG/DL (ref 8–23)
BUN/CREAT SERPL: 16.8 (ref 7–25)
CALCIUM SPEC-SCNC: 8.3 MG/DL (ref 8.6–10.5)
CHLORIDE SERPL-SCNC: 99 MMOL/L (ref 98–107)
CO2 SERPL-SCNC: 25.1 MMOL/L (ref 22–29)
CREAT SERPL-MCNC: 1.19 MG/DL (ref 0.76–1.27)
DEPRECATED RDW RBC AUTO: 41.6 FL (ref 37–54)
EGFRCR SERPLBLD CKD-EPI 2021: 63.3 ML/MIN/1.73
ERYTHROCYTE [DISTWIDTH] IN BLOOD BY AUTOMATED COUNT: 12.8 % (ref 12.3–15.4)
GLUCOSE SERPL-MCNC: 180 MG/DL (ref 65–99)
HCT VFR BLD AUTO: 34.8 % (ref 37.5–51)
HGB BLD-MCNC: 12 G/DL (ref 13–17.7)
MCH RBC QN AUTO: 31.3 PG (ref 26.6–33)
MCHC RBC AUTO-ENTMCNC: 34.5 G/DL (ref 31.5–35.7)
MCV RBC AUTO: 90.6 FL (ref 79–97)
PLATELET # BLD AUTO: 229 10*3/MM3 (ref 140–450)
PMV BLD AUTO: 8.8 FL (ref 6–12)
POTASSIUM SERPL-SCNC: 4.2 MMOL/L (ref 3.5–5.2)
RBC # BLD AUTO: 3.84 10*6/MM3 (ref 4.14–5.8)
SODIUM SERPL-SCNC: 137 MMOL/L (ref 136–145)
WBC NRBC COR # BLD: 10.19 10*3/MM3 (ref 3.4–10.8)

## 2023-02-10 PROCEDURE — 63710000001 OXYCODONE-ACETAMINOPHEN 5-325 MG TABLET: Performed by: ORTHOPAEDIC SURGERY

## 2023-02-10 PROCEDURE — A9270 NON-COVERED ITEM OR SERVICE: HCPCS | Performed by: ORTHOPAEDIC SURGERY

## 2023-02-10 PROCEDURE — 63710000001 FENOFIBRATE 145 MG TABLET: Performed by: ORTHOPAEDIC SURGERY

## 2023-02-10 PROCEDURE — 63710000001 ASPIRIN EC 325 MG TABLET DELAYED-RELEASE: Performed by: ORTHOPAEDIC SURGERY

## 2023-02-10 PROCEDURE — 94760 N-INVAS EAR/PLS OXIMETRY 1: CPT

## 2023-02-10 PROCEDURE — 94799 UNLISTED PULMONARY SVC/PX: CPT

## 2023-02-10 PROCEDURE — 63710000001 POTASSIUM CHLORIDE 10 MEQ TABLET CONTROLLED-RELEASE: Performed by: ORTHOPAEDIC SURGERY

## 2023-02-10 PROCEDURE — G0378 HOSPITAL OBSERVATION PER HR: HCPCS

## 2023-02-10 PROCEDURE — 63710000001 FERROUS SULFATE 325 (65 FE) MG TABLET: Performed by: ORTHOPAEDIC SURGERY

## 2023-02-10 PROCEDURE — 94664 DEMO&/EVAL PT USE INHALER: CPT

## 2023-02-10 PROCEDURE — 63710000001 ATORVASTATIN 20 MG TABLET: Performed by: ORTHOPAEDIC SURGERY

## 2023-02-10 PROCEDURE — 80048 BASIC METABOLIC PNL TOTAL CA: CPT | Performed by: ORTHOPAEDIC SURGERY

## 2023-02-10 PROCEDURE — 63710000001 CELECOXIB 200 MG CAPSULE: Performed by: ORTHOPAEDIC SURGERY

## 2023-02-10 PROCEDURE — 85027 COMPLETE CBC AUTOMATED: CPT | Performed by: ORTHOPAEDIC SURGERY

## 2023-02-10 PROCEDURE — 97530 THERAPEUTIC ACTIVITIES: CPT

## 2023-02-10 PROCEDURE — 63710000001 LINAGLIPTIN 5 MG TABLET: Performed by: ORTHOPAEDIC SURGERY

## 2023-02-10 RX ADMIN — ATORVASTATIN CALCIUM 10 MG: 20 TABLET, FILM COATED ORAL at 09:12

## 2023-02-10 RX ADMIN — LINAGLIPTIN 5 MG: 5 TABLET, FILM COATED ORAL at 09:12

## 2023-02-10 RX ADMIN — Medication 10 ML: at 09:13

## 2023-02-10 RX ADMIN — OXYCODONE AND ACETAMINOPHEN 1 TABLET: 5; 325 TABLET ORAL at 09:12

## 2023-02-10 RX ADMIN — CELECOXIB 200 MG: 200 CAPSULE ORAL at 09:12

## 2023-02-10 RX ADMIN — FENOFIBRATE 145 MG: 145 TABLET ORAL at 09:13

## 2023-02-10 RX ADMIN — POTASSIUM CHLORIDE 10 MEQ: 750 TABLET, EXTENDED RELEASE ORAL at 09:12

## 2023-02-10 RX ADMIN — CLINDAMYCIN PHOSPHATE 900 MG: 900 INJECTION, SOLUTION INTRAVENOUS at 02:00

## 2023-02-10 RX ADMIN — FERROUS SULFATE TAB 325 MG (65 MG ELEMENTAL FE) 325 MG: 325 (65 FE) TAB at 09:12

## 2023-02-10 RX ADMIN — ASPIRIN 325 MG: 325 TABLET, COATED ORAL at 09:12

## 2023-02-10 RX ADMIN — OXYCODONE AND ACETAMINOPHEN 1 TABLET: 5; 325 TABLET ORAL at 04:09

## 2023-02-10 RX ADMIN — BUDESONIDE AND FORMOTEROL FUMARATE DIHYDRATE 2 PUFF: 160; 4.5 AEROSOL RESPIRATORY (INHALATION) at 09:08

## 2023-02-10 NOTE — PLAN OF CARE
Goal Outcome Evaluation:  Plan of Care Reviewed With: patient           Outcome Evaluation: Patient seen for PT session this AM. Patient is POD1 L TKA. Patient UIC upon arrival. Patient performed TKA post op protocol. Patient performed STS from chair with SBA. Patient ambulated 125ft in hallway with rwx and SBA. All mobility concerns addressed. Patient planning to return home with assist and OP follow up. Acute PT will sign off at this time.

## 2023-02-10 NOTE — CASE MANAGEMENT/SOCIAL WORK
Case Management Discharge Note      Final Note: Patient discharged home with plans to complete OP PT.         Selected Continued Care - Admitted Since 2/9/2023     Destination    No services have been selected for the patient.              Durable Medical Equipment    No services have been selected for the patient.              Dialysis/Infusion    No services have been selected for the patient.              Home Medical Care    No services have been selected for the patient.              Therapy    No services have been selected for the patient.              Community Resources    No services have been selected for the patient.              Community & DME    No services have been selected for the patient.                       Final Discharge Disposition Code: 01 - home or self-care

## 2023-02-10 NOTE — PLAN OF CARE
POD 1. VSS. Room air. Ambulating with assist and walker. IV Clindamycin completed. Pain treated x 1. Wife at bedside. IV saline locked. Plan DC home with family. L knee ace wrapped, ice packs given.    Problem: Adult Inpatient Plan of Care  Goal: Absence of Hospital-Acquired Illness or Injury  Intervention: Prevent Skin Injury  Recent Flowsheet Documentation  Taken 2/10/2023 0200 by Tanya Garcia RN  Body Position:   sitting up in bed   position changed independently  Taken 2/10/2023 0001 by Tanya Garcia RN  Body Position: sitting up in bed  Taken 2/9/2023 2205 by Tanya Garcia RN  Body Position:   position changed independently   sitting up in bed   legs elevated  Taken 2/9/2023 1945 by Tanya Garcia RN  Body Position: sitting up in bed   Goal Outcome Evaluation:

## 2023-02-10 NOTE — THERAPY TREATMENT NOTE
Patient Name: Juan Blanca  : 1945    MRN: [id number]                              Today's Date: 2/10/2023       Admit Date: 2023    Visit Dx:     ICD-10-CM ICD-9-CM   1. Primary osteoarthritis of left knee  M17.12 715.16     Patient Active Problem List   Diagnosis   • Primary osteoarthritis of left knee     Past Medical History:   Diagnosis Date   • Asthma     AS A CHILD   • BPH (benign prostatic hyperplasia)    • Diabetes (HCC)    • History of kidney stones    • Hyperlipidemia    • Hypertension    • Osteoarthritis     LEFT KNEE   • Seasonal allergies      Past Surgical History:   Procedure Laterality Date   • COLONOSCOPY     • EXTRACORPOREAL SHOCK WAVE LITHOTRIPSY (ESWL)     • PAROTID BIOPSY/TUMOR EXCISION Right    • TONSILLECTOMY     • TOTAL KNEE ARTHROPLASTY Left 2023    Procedure: LEFT TOTAL KNEE ARTHROPLASTY;  Surgeon: Dawson Haney MD;  Location: [facility];  Service: Orthopedics;  Laterality: Left;      General Information     Row Name 02/10/23 0855          Physical Therapy Time and Intention    Document Type discharge treatment  -     Mode of Treatment individual therapy;physical therapy  -     Row Name 02/10/23 0855          General Information    Patient Profile Reviewed yes  -     Existing Precautions/Restrictions fall  -     Row Name 02/10/23 0855          Cognition    Orientation Status (Cognition) oriented x 4  -     Row Name 02/10/23 0855          Safety Issues, Functional Mobility    Impairments Affecting Function (Mobility) pain;endurance/activity tolerance;strength;range of motion (ROM)  -           User Key  (r) = Recorded By, (t) = Taken By, (c) = Cosigned By    Initials Name Provider Type     Katherine Turner PT Physical Therapist               Mobility     Row Name 02/10/23 0856          Bed Mobility    Comment, (Bed Mobility) Patient UIC upon arrival  -     Row Name 02/10/23 0856          Sit-Stand Transfer    Sit-Stand [place] (Transfers)  standby assist  -     Assistive Device (Sit-Stand Transfers) walker, front-wheeled  -     Row Name 02/10/23 0856          Gait/Stairs (Locomotion)    [place] Level (Gait) standby assist  -     Assistive Device (Gait) walker, front-wheeled  -     Distance in Feet (Gait) 125ft  -     Deviations/Abnormal Patterns (Gait) gait speed decreased;antalgic  -     Comment, (Gait/Stairs) Gait slow and antalgic but steady with patien table to use step through gait pattern. No overt LOB noted.  -           User Key  (r) = Recorded By, (t) = Taken By, (c) = Cosigned By    Initials Name Provider Type     Katherine Turner PT Physical Therapist               Obj/Interventions     Row Name 02/10/23 0857          Range of Motion Comprehensive    Comment, General Range of Motion L knee flexion 0-90 degrees  -     Row Name 02/10/23 0857          Motor Skills    Therapeutic Exercise knee  TKA post op protocol  -     Row Name 02/10/23 0857          Balance    Balance Assessment sitting static balance;sitting dynamic balance;sit to stand dynamic balance;standing static balance;standing dynamic balance  -     Static Sitting Balance independent  -     Dynamic Sitting Balance modified independence  -     Position, Sitting Balance sitting in chair  -     Sit to Stand Dynamic Balance standby assist  -     Static Standing Balance standby assist  -     Dynamic Standing Balance standby assist  -     Position/Device Used, Standing Balance supported;walker, front-wheeled  -     Balance Interventions sitting;standing;sit to stand;supported;static;dynamic  -           User Key  (r) = Recorded By, (t) = Taken By, (c) = Cosigned By    Initials Name Provider Type     Katherine Turner PT Physical Therapist               Goals/Plan    No documentation.                Clinical Impression     Row Name 02/10/23 0858          Pain    Pretreatment Pain Rating 7/10  -SM     Posttreatment Pain Rating 7/10  -SM      Pain Location - Side/Orientation Left  -SM     Pain Location - knee  -     Pain Intervention(s) Rest;Repositioned;Ambulation/increased activity  -     Row Name 02/10/23 0858          Plan of Care Review    Plan of Care Reviewed With patient  -SM     Outcome Evaluation Patient seen for PT session this AM. Patient is POD1 L TKA. Patient UIC upon arrival. Patient performed TKA post op protocol. Patient performed STS from chair with SBA. Patient ambulated 125ft in hallway with rwx and SBA. All mobility concerns addressed. Patient planning to return home with assist and OP follow up. Acute PT will sign off at this time.  -     Row Name 02/10/23 0858          Vital Signs    O2 Delivery Pre Treatment room air  -SM     O2 Delivery Intra Treatment room air  -SM     O2 Delivery Post Treatment room air  -SM     Pre Patient Position Sitting  -SM     Intra Patient Position Standing  -SM     Post Patient Position Sitting  -SM     Row Name 02/10/23 0858          Positioning and Restraints    Pre-Treatment Position sitting in chair/recliner  -SM     Post Treatment Position chair  -SM     In Chair notified nsg;reclined;sitting;call light within reach;encouraged to call for assist;exit alarm on  -           User Key  (r) = Recorded By, (t) = Taken By, (c) = Cosigned By    Initials Name Provider Type     Katherine Turner, PT Physical Therapist               Outcome Measures     Row Name 02/10/23 0900          How much help from another person do you currently need...    Turning from your back to your side while in flat bed without using bedrails? 4  -SM     Moving from lying on back to sitting on the side of a flat bed without bedrails? 4  -SM     Moving to and from a bed to a chair (including a wheelchair)? 4  -SM     Standing up from a chair using your arms (e.g., wheelchair, bedside chair)? 4  -SM     Climbing 3-5 steps with a railing? 3  -SM     To walk in hospital room? 4  -SM     AM-PAC 6 Clicks Score (PT) 23  -SM      Highest level of mobility 7 --> Walked 25 feet or more  -     Row Name 02/10/23 0900          Functional Assessment    Outcome Measure Options AM-PAC 6 Clicks Basic Mobility (PT)  -           User Key  (r) = Recorded By, (t) = Taken By, (c) = Cosigned By    Initials Name Provider Type     Katherine Turner PT Physical Therapist                             Physical Therapy Education     Title: PT OT SLP Therapies (Done)     Topic: Physical Therapy (Done)     Point: Mobility training (Done)     Learning Progress Summary           Patient Acceptance, E, VU by  at 2/10/2023 0900    Acceptance, E, VU by  at 2/9/2023 1555                   Point: Home exercise program (Done)     Learning Progress Summary           Patient Acceptance, E, VU by  at 2/10/2023 0900    Acceptance, E, VU by  at 2/9/2023 1555                   Point: Body mechanics (Done)     Learning Progress Summary           Patient Acceptance, E, VU by  at 2/10/2023 0900    Acceptance, E, VU by  at 2/9/2023 1555                   Point: Precautions (Done)     Learning Progress Summary           Patient Acceptance, E, VU by  at 2/10/2023 0900    Acceptance, E, VU by  at 2/9/2023 1555                               User Key     Initials Effective Dates Name Provider Type Discipline     05/02/22 -  Katherine Turner PT Physical Therapist PT              PT Recommendation and Plan     Plan of Care Reviewed With: patient  Outcome Evaluation: Patient seen for PT session this AM. Patient is POD1 L TKA. Patient UIC upon arrival. Patient performed TKA post op protocol. Patient performed STS from chair with SBA. Patient ambulated 125ft in hallway with rwx and SBA. All mobility concerns addressed. Patient planning to return home with assist and OP follow up. Acute PT will sign off at this time.     Time Calculation:    PT Charges     Row Name 02/10/23 0900             Time Calculation    Start Time 0833  -      Stop Time 0843  -      Time  Calculation (min) 10 min  -SM      PT Received On 02/10/23  -         Time Calculation- PT    Total Timed Code Minutes- PT 10 minute(s)  -SM         Timed Charges    10603 - PT Therapeutic Exercise Minutes 2  -SM      85503 - PT Therapeutic Activity Minutes 8  -SM         Total Minutes    Timed Charges Total Minutes 10  -SM       Total Minutes 10  -SM            User Key  (r) = Recorded By, (t) = Taken By, (c) = Cosigned By    Initials Name Provider Type     Katherine Turner, ELVIN Physical Therapist              Therapy Charges for Today     Code Description Service Date Service Provider Modifiers Qty    90190494717  PT THER PROC EA 15 MIN 2/9/2023 Katherine Turner, PT GP 1    69627393899 HC PT EVAL LOW COMPLEXITY 3 2/9/2023 Katherine Turner, PT GP 1    11372102556 HC PT THERAPEUTIC ACT EA 15 MIN 2/10/2023 Katherine Turner, PT GP 1          PT G-Codes  Outcome Measure Options: AM-PAC 6 Clicks Basic Mobility (PT)  AM-PAC 6 Clicks Score (PT): 23  PT Discharge Summary  Anticipated Discharge Disposition (PT): home with home health, home with assist  Patient was intermittently wearing a face mask during this therapy encounter. Therapist used appropriate personal protective equipment including mask and gloves.  Mask used was standard procedure mask. Appropriate PPE was worn during the entire therapy session. Hand hygiene was completed before and after therapy session. Patient is not in enhanced droplet precautions.     Katherine Turner, ELVIN  2/10/2023

## 2023-02-10 NOTE — PROGRESS NOTES
Orthopaedic Surgery  Progress Note  2/10/2023    Patients Name:  Juan Blanca  YOB: 1945  Age: 77 y.o.  Medical Records Number:  [id number]  Date of Admission: 2/9/2023    No complaints except appropriate pain and stiffness in the left knee    Vitals:  Vitals:    02/09/23 2118 02/09/23 2141 02/10/23 0231 02/10/23 0620   BP:  115/67 108/57 128/58   BP Location:  Left arm Right arm Right arm   Patient Position:  Lying Lying Sitting   Pulse: 84 75 69 72   Resp: 18 18 18 18   Temp:  99.1 °F (37.3 °C) 98.7 °F (37.1 °C) 98.3 °F (36.8 °C)   TempSrc:  Oral Oral Oral   SpO2: 95% 94% 94% 94%   Weight:       Height:           LLE:  NVI, calf nontender, Sensation intact  No signs of DVT    Incision: clean, no signs of infection    Lab Results (last 24 hours)     Procedure Component Value Units Date/Time    Basic Metabolic Panel [[id number]]  (Abnormal) Collected: 02/10/23 0436    Specimen: Blood Updated: 02/10/23 0545     Glucose 180 mg/dL      BUN 20 mg/dL      Creatinine 1.19 mg/dL      Sodium 137 mmol/L      Potassium 4.2 mmol/L      Chloride 99 mmol/L      CO2 25.1 mmol/L      Calcium 8.3 mg/dL      BUN/Creatinine Ratio 16.8     Anion Gap 12.9 mmol/L      eGFR 63.3 mL/min/1.73     Narrative:      GFR Normal >60  Chronic Kidney Disease <60  Kidney Failure <15    The GFR formula is only valid for adults with stable renal function between ages 18 and 70.    CBC (No Diff) [[id number]]  (Abnormal) Collected: 02/10/23 0436    Specimen: Blood Updated: 02/10/23 0529     WBC 10.19 10*3/mm3      RBC 3.84 10*6/mm3      Hemoglobin 12.0 g/dL      Hematocrit 34.8 %      MCV 90.6 fL      MCH 31.3 pg      MCHC 34.5 g/dL      RDW 12.8 %      RDW-SD 41.6 fl      MPV 8.8 fL      Platelets 229 10*3/mm3     POC Glucose Once [[id number]]  (Abnormal) Collected: 02/09/23 0814    Specimen: Blood Updated: 02/09/23 0815     Glucose 148 mg/dL      Comment: Meter: [id number] : [id number] Yuri MULLER RN             XR Knee 1  or 2 View Left    Result Date: 2/9/2023  Narrative: XR KNEE 1 OR 2 VW LEFT-  INDICATIONS: Postoperative evaluation.  TECHNIQUE: Frontal and lateral views of the left knee  COMPARISON: 01/27/2023  FINDINGS:   Intact appearing knee arthroplasty hardware is seen with adjacent surgical soft tissue gas, overlying skin staples. No acute fracture is identified.       Impression:  Postsurgical changes.    This report was finalized on 2/9/2023 1:13 PM by Dr. Derek Morelos M.D.      XR Chest PA & Lateral, XR Knee 1 or 2 View Left    Result Date: 1/27/2023  Narrative: XR CHEST PA AND LATERAL-, XR KNEE 1 OR 2 VW LEFT-clinical: Hypertension preop knee surgery  STANDING VIEWS LEFT KNEE FINDINGS: Medial compartment joint space loss with a bone-on-bone configuration and articular irregularity. There is narrowing of the lateral compartment with periarticular bone hypertrophy. There is exuberant bone hypertrophy and spur formation about the patellofemoral joint. No bone lesion, joint effusion or fracture.  CONCLUSION: Substantial left knee joint degeneration as discussed above.  Chest findings: There is clothing artifact superimposing the lung apices. There is a 6 mm nodular density superimposing the right first rib at the lung apex. It is indeterminant.  Cardiac size within normal limits. No mediastinal abnormality. No pleural effusion nor vascular congestion seen.  There is a fairly dense 12 x 5 mm density at the left lung base, this probably calcified and likely benign. This may simply represent a granuloma.  CONCLUSION: 1. Probably benign likely calcified 12 x 5 mm nodular density at the left base. 2. Clothing artifact superimposing the lung apices with a 6 mm indeterminate area of nodularity superimposing the right first rib. 3. No active pulmonary process is demonstrated.  I would recommend follow-up PA and lateral chest radiograph to include apical lordotic views (in a hospital gown) in 4-6 weeks for further evaluation.   This report was finalized on 1/27/2023 3:48 PM by Dr. Dylan Parham M.D.      Peripheral Block    Result Date: 2/9/2023  Narrative: Les Felix MD     2/9/2023  9:17 AM Peripheral Block Patient reassessed immediately prior to procedure Start time: 2/9/2023 9:15 AM Stop time: 2/9/2023 9:17 AM Reason for block: at surgeon's request and post-op pain management Performed by Anesthesiologist: Les Felix MD Preanesthetic Checklist Completed: patient identified, risks and benefits discussed, surgical consent, pre-op evaluation and timeout performed Prep: Pt Position: supine Sterile barriers:cap, gloves, mask and washed/disinfected hands Prep: ChloraPrep Patient monitoring: blood pressure monitoring, continuous pulse oximetry and EKG Procedure Sedation: yes Guidance:ultrasound guided ULTRASOUND INTERPRETATION.  Using ultrasound guidance a 22 G gauge needle was placed in close proximity to the nerve, at which point, under ultrasound guidance anesthetic was injected in the area of the nerve and spread of the anesthesia was seen on ultrasound in close proximity thereto.  There were no abnormalities seen on ultrasound; a digital image was taken; and the patient tolerated the procedure with no complications. Images:still images obtained, printed/placed on chart Laterality:left Block Type:adductor canal block Injection Technique:single-shot Needle Type:echogenic Needle Gauge:22 G Medications Used: ropivacaine (NAROPIN) 0.5 % injection - Injection  20 mL - 2/9/2023 9:15:00 AM Post Assessment Injection Assessment: negative aspiration for heme, no paresthesia on injection and incremental injection Patient Tolerance:comfortable throughout block Complications:no Additional Notes ULTRASOUND INTERPRETATION. Using ultrasound guidance theneedle was placed in close proximity to the nerve, at which point, under ultrasound guidance, local anesthetic was injected around but not in the nerve and spread of the anesthesia was  seen on ultrasound in close proximity thereto.  There were no abnormalities seen on ultrasound; a digital image was taken; and the patient tolerated the procedure with no complications.       Assesment/Plan:    Procedures:  Left TKA  Postoperative Day: 1  Weightbearing Status:  WBAT with walker  DVT Prophylaxis:  ASA for DVT prophylaxis    Disposition:  Home with home health after PT today, if comfortable and mobilizing safely    Dawson Yen PA-C  [facility]  [address] [place], [place] [address] [phone]    2/10/2023

## 2023-02-16 ENCOUNTER — TELEPHONE (OUTPATIENT)
Dept: ORTHOPEDIC SURGERY | Facility: HOSPITAL | Age: 78
End: 2023-02-16
Payer: MEDICARE

## 2023-02-16 NOTE — TELEPHONE ENCOUNTER
Called and spoke with Mr. Blanca to see how he is doing as he is 1 week SP LTK. He said he is doing well. He is working with PT and making progress. Pain is controlled with the pain medication. He is taking it regularly every 4 hours and it keeps him ahead of it. He is walking, icing, and elevating. BM's are normal. Mr. Blanca doesn't have any questions for me at this time. He was given my contact information should he need anything. He voiced understanding and appreciated the call.

## (undated) DEVICE — GLV SURG BIOGEL LTX PF 7 1/2

## (undated) DEVICE — DECANTER BAG 9": Brand: MEDLINE INDUSTRIES, INC.

## (undated) DEVICE — DRSNG GZ PETROLTM XEROFORM CURAD 1X8IN STRL

## (undated) DEVICE — PAD,ABDOMINAL,8"X10",ST,LF: Brand: MEDLINE

## (undated) DEVICE — PK KN TOTL 40

## (undated) DEVICE — GLV SURG PREMIERPRO ORTHO LTX PF SZ7.5 BRN

## (undated) DEVICE — STPLR SKIN VISISTAT WD 35CT

## (undated) DEVICE — BNDG ELAS ELITE V/CLOSE 4IN 5YD LF STRL

## (undated) DEVICE — ANTIBACTERIAL UNDYED BRAIDED (POLYGLACTIN 910), SYNTHETIC ABSORBABLE SUTURE: Brand: COATED VICRYL

## (undated) DEVICE — DUAL CUT SAGITTAL BLADE

## (undated) DEVICE — SYRINGE, LUER LOCK, 60ML: Brand: MEDLINE

## (undated) DEVICE — APPL CHLORAPREP HI/LITE 26ML ORNG

## (undated) DEVICE — KT DRN EVAC WND PVC PCH WTROC RND 10F400

## (undated) DEVICE — CONTAINER,SPECIMEN,OR STERILE,4OZ: Brand: MEDLINE

## (undated) DEVICE — DRAPE,REIN 53X77,STERILE: Brand: MEDLINE

## (undated) DEVICE — NEEDLE, QUINCKE, 20GX3.5": Brand: MEDLINE

## (undated) DEVICE — UNDERCAST PADDING: Brand: DEROYAL

## (undated) DEVICE — PENCL ES MEGADINE EZ/CLEAN BUTN W/HOLSTR 10FT